# Patient Record
Sex: FEMALE
[De-identification: names, ages, dates, MRNs, and addresses within clinical notes are randomized per-mention and may not be internally consistent; named-entity substitution may affect disease eponyms.]

---

## 2020-03-13 ENCOUNTER — NURSE TRIAGE (OUTPATIENT)
Dept: OTHER | Facility: CLINIC | Age: 65
End: 2020-03-13

## 2020-03-13 NOTE — TELEPHONE ENCOUNTER
Reason for Disposition   No CORONAVIRUS EXPOSURE BUT [2] questions about    Protocols used: CORONAVIRUS (2019-NCOV) EXPOSURE-ADULT-[place]    Pt calling for MMO benefit. Caller is asking questions about her grandson's birthday party, if it should still be held. No complaints from pt. Just seeking information. Provided pt with information regarding Corona virus and exposure from protocol. Patient verbalized understanding. Patient denies any other questions or concerns. Please do not respond to the triage nurse through this encounter. Any subsequent communication should be directly with the patient.

## 2021-12-31 DIAGNOSIS — E83.52 SERUM CALCIUM ELEVATED: Primary | ICD-10-CM

## 2023-03-13 PROBLEM — M54.17 LUMBOSACRAL RADICULITIS: Status: ACTIVE | Noted: 2023-03-13

## 2023-03-13 PROBLEM — H90.41 SENSORINEURAL HEARING LOSS (SNHL) OF RIGHT EAR WITH UNRESTRICTED HEARING OF LEFT EAR: Status: ACTIVE | Noted: 2023-03-13

## 2023-03-13 PROBLEM — R92.8 ABNORMAL ULTRASOUND OF BREAST: Status: ACTIVE | Noted: 2023-03-13

## 2023-03-13 PROBLEM — M25.551 HIP PAIN, RIGHT: Status: ACTIVE | Noted: 2023-03-13

## 2023-03-13 PROBLEM — R92.8 ABNORMAL FINDING ON BREAST IMAGING: Status: ACTIVE | Noted: 2023-03-13

## 2023-03-13 PROBLEM — S56.912A STRAIN OF FOREARM, LEFT: Status: ACTIVE | Noted: 2023-03-13

## 2023-03-13 PROBLEM — M19.90 ARTHRITIS: Status: ACTIVE | Noted: 2023-03-13

## 2023-03-13 PROBLEM — R14.0 ABDOMINAL BLOATING: Status: ACTIVE | Noted: 2023-03-13

## 2023-03-13 PROBLEM — R21 LOCALIZED RASH: Status: ACTIVE | Noted: 2023-03-13

## 2023-03-13 PROBLEM — R92.8 ABNORMAL FINDING ON BREAST IMAGING: Status: RESOLVED | Noted: 2023-03-13 | Resolved: 2023-03-13

## 2023-03-13 PROBLEM — I10 HTN (HYPERTENSION): Status: ACTIVE | Noted: 2023-03-13

## 2023-03-13 PROBLEM — K59.00 CONSTIPATION: Status: ACTIVE | Noted: 2023-03-13

## 2023-03-13 PROBLEM — M54.9 BACK PAIN: Status: ACTIVE | Noted: 2023-03-13

## 2023-03-13 PROBLEM — K58.9 IRRITABLE BOWEL: Status: ACTIVE | Noted: 2023-03-13

## 2023-03-13 PROBLEM — R20.9 SENSORY DISTURBANCE: Status: ACTIVE | Noted: 2023-03-13

## 2023-03-13 PROBLEM — R92.8 ABNORMALITY OF LEFT BREAST ON SCREENING MAMMOGRAM: Status: ACTIVE | Noted: 2023-03-13

## 2023-03-13 PROBLEM — H91.90 HEARING LOSS: Status: ACTIVE | Noted: 2023-03-13

## 2023-03-13 PROBLEM — R20.8 DYSESTHESIA: Status: ACTIVE | Noted: 2023-03-13

## 2023-03-13 PROBLEM — K21.9 ACID REFLUX: Status: ACTIVE | Noted: 2023-03-13

## 2023-03-13 RX ORDER — PANTOPRAZOLE SODIUM 40 MG/1
40 TABLET, DELAYED RELEASE ORAL
COMMUNITY
Start: 2020-05-19 | End: 2023-12-06 | Stop reason: SDUPTHER

## 2023-03-13 RX ORDER — CAPSAICIN 0 G/G
CREAM TOPICAL 3 TIMES DAILY
COMMUNITY
End: 2023-07-03 | Stop reason: ALTCHOICE

## 2023-03-13 RX ORDER — TIZANIDINE 2 MG/1
1 TABLET ORAL 2 TIMES DAILY
COMMUNITY
Start: 2022-09-20 | End: 2023-07-03 | Stop reason: ALTCHOICE

## 2023-03-13 RX ORDER — LISINOPRIL AND HYDROCHLOROTHIAZIDE 20; 25 MG/1; MG/1
1 TABLET ORAL DAILY
COMMUNITY
Start: 2014-03-04 | End: 2023-04-14

## 2023-03-13 RX ORDER — MELOXICAM 15 MG/1
1 TABLET ORAL DAILY PRN
COMMUNITY
Start: 2022-09-20

## 2023-03-13 RX ORDER — AMLODIPINE BESYLATE 2.5 MG/1
1 TABLET ORAL DAILY
COMMUNITY
Start: 2019-02-01 | End: 2023-04-14

## 2023-03-14 ENCOUNTER — APPOINTMENT (OUTPATIENT)
Dept: PRIMARY CARE | Facility: CLINIC | Age: 68
End: 2023-03-14
Payer: MEDICARE

## 2023-03-15 ENCOUNTER — LAB (OUTPATIENT)
Dept: LAB | Facility: LAB | Age: 68
End: 2023-03-15
Payer: MEDICARE

## 2023-03-15 ENCOUNTER — OFFICE VISIT (OUTPATIENT)
Dept: PRIMARY CARE | Facility: CLINIC | Age: 68
End: 2023-03-15
Payer: MEDICARE

## 2023-03-15 VITALS
BODY MASS INDEX: 31.69 KG/M2 | HEIGHT: 64 IN | WEIGHT: 185.6 LBS | OXYGEN SATURATION: 98 % | TEMPERATURE: 98 F | RESPIRATION RATE: 16 BRPM | HEART RATE: 53 BPM | SYSTOLIC BLOOD PRESSURE: 126 MMHG | DIASTOLIC BLOOD PRESSURE: 67 MMHG

## 2023-03-15 DIAGNOSIS — Z01.818 PRE-OP EVALUATION: Primary | ICD-10-CM

## 2023-03-15 DIAGNOSIS — R01.1 HEART MURMUR: ICD-10-CM

## 2023-03-15 DIAGNOSIS — Z86.79 HX OF CARDIAC MURMUR: Primary | ICD-10-CM

## 2023-03-15 DIAGNOSIS — E83.52 SERUM CALCIUM ELEVATED: ICD-10-CM

## 2023-03-15 DIAGNOSIS — Z01.818 PRE-OP TESTING: ICD-10-CM

## 2023-03-15 LAB
ANION GAP IN SER/PLAS: 12 MMOL/L (ref 10–20)
CALCIDIOL (25 OH VITAMIN D3) (NG/ML) IN SER/PLAS: 36 NG/ML
CALCIUM (MG/DL) IN SER/PLAS: 10.6 MG/DL (ref 8.6–10.6)
CARBON DIOXIDE, TOTAL (MMOL/L) IN SER/PLAS: 30 MMOL/L (ref 21–32)
CHLORIDE (MMOL/L) IN SER/PLAS: 100 MMOL/L (ref 98–107)
CREATININE (MG/DL) IN SER/PLAS: 0.81 MG/DL (ref 0.5–1.05)
GFR FEMALE: 80 ML/MIN/1.73M2
GLUCOSE (MG/DL) IN SER/PLAS: 98 MG/DL (ref 74–99)
PARATHYRIN INTACT (PG/ML) IN SER/PLAS: 55.2 PG/ML (ref 18.5–88)
PHOSPHATE (MG/DL) IN SER/PLAS: 3.2 MG/DL (ref 2.5–4.9)
POC CALCIUM IONIZED (MMOL/L) IN BLOOD: 1.36 MMOL/L (ref 1.1–1.33)
POTASSIUM (MMOL/L) IN SER/PLAS: 3.4 MMOL/L (ref 3.5–5.3)
SODIUM (MMOL/L) IN SER/PLAS: 139 MMOL/L (ref 136–145)
UREA NITROGEN (MG/DL) IN SER/PLAS: 14 MG/DL (ref 6–23)

## 2023-03-15 PROCEDURE — 36415 COLL VENOUS BLD VENIPUNCTURE: CPT

## 2023-03-15 PROCEDURE — 84100 ASSAY OF PHOSPHORUS: CPT

## 2023-03-15 PROCEDURE — 99215 OFFICE O/P EST HI 40 MIN: CPT | Performed by: INTERNAL MEDICINE

## 2023-03-15 PROCEDURE — 82306 VITAMIN D 25 HYDROXY: CPT

## 2023-03-15 PROCEDURE — 3078F DIAST BP <80 MM HG: CPT | Performed by: INTERNAL MEDICINE

## 2023-03-15 PROCEDURE — 80048 BASIC METABOLIC PNL TOTAL CA: CPT

## 2023-03-15 PROCEDURE — 83970 ASSAY OF PARATHORMONE: CPT

## 2023-03-15 PROCEDURE — 3074F SYST BP LT 130 MM HG: CPT | Performed by: INTERNAL MEDICINE

## 2023-03-15 PROCEDURE — 82330 ASSAY OF CALCIUM: CPT

## 2023-03-15 PROCEDURE — 1160F RVW MEDS BY RX/DR IN RCRD: CPT | Performed by: INTERNAL MEDICINE

## 2023-03-15 PROCEDURE — 1159F MED LIST DOCD IN RCRD: CPT | Performed by: INTERNAL MEDICINE

## 2023-03-15 ASSESSMENT — ENCOUNTER SYMPTOMS
CHILLS: 0
LIGHT-HEADEDNESS: 0
CHEST TIGHTNESS: 0
UNEXPECTED WEIGHT CHANGE: 0
ENDOCRINE NEGATIVE: 1
APPETITE CHANGE: 0
ADENOPATHY: 0
PSYCHIATRIC NEGATIVE: 1
COUGH: 0
APNEA: 0
JOINT SWELLING: 0
DIARRHEA: 0
DIZZINESS: 0
ANAL BLEEDING: 0
WEAKNESS: 0
SORE THROAT: 0
BLOOD IN STOOL: 0
EYE REDNESS: 0
SHORTNESS OF BREATH: 0
PHOTOPHOBIA: 0
ARTHRALGIAS: 1
WHEEZING: 0
WOUND: 0
FEVER: 0
ABDOMINAL PAIN: 0
ACTIVITY CHANGE: 0
BRUISES/BLEEDS EASILY: 0
SINUS PRESSURE: 0
FATIGUE: 0
NAUSEA: 0
VOMITING: 0
PALPITATIONS: 0
SEIZURES: 0
NUMBNESS: 0
STRIDOR: 0
RHINORRHEA: 0
HEADACHES: 0
EYE PAIN: 0

## 2023-03-15 ASSESSMENT — PATIENT HEALTH QUESTIONNAIRE - PHQ9
1. LITTLE INTEREST OR PLEASURE IN DOING THINGS: NOT AT ALL
2. FEELING DOWN, DEPRESSED OR HOPELESS: NOT AT ALL
SUM OF ALL RESPONSES TO PHQ9 QUESTIONS 1 AND 2: 0

## 2023-03-15 ASSESSMENT — LIFESTYLE VARIABLES
HOW OFTEN DO YOU HAVE A DRINK CONTAINING ALCOHOL: NEVER
HOW OFTEN DO YOU HAVE SIX OR MORE DRINKS ON ONE OCCASION: NEVER

## 2023-03-15 NOTE — PROGRESS NOTES
Subjective   Patient ID: Britt Guillaume is a 67 y.o. female who presents for Procedure (Surgery clearance).  The patient is a 67-year-old female who is being seen today for preop evaluation.  She states that she is scheduled for left knee replacement surgery tomorrow 03/16/2023.  However she is considering rescheduling her surgery.  The patient was found to have an elevated serum calcium level of 11.0 and a history of a heart murmur for which preadmission testing requested preop clearance.  The patient had a previous appointment scheduled on 03/15/2023 for preop evaluation.  However she is states that she was unable to keep that appointment because of her previous commitment.        Review of Systems   Constitutional:  Negative for activity change, appetite change, chills, fatigue, fever and unexpected weight change.   HENT:  Negative for congestion, ear pain, hearing loss, rhinorrhea, sinus pressure and sore throat.    Eyes:  Negative for photophobia, pain, redness and visual disturbance.   Respiratory:  Negative for apnea, cough, chest tightness, shortness of breath, wheezing and stridor.    Cardiovascular:  Negative for chest pain, palpitations and leg swelling.   Gastrointestinal:  Negative for abdominal pain, anal bleeding, blood in stool, diarrhea, nausea and vomiting.   Endocrine: Negative.  Negative for cold intolerance.   Genitourinary: Negative.    Musculoskeletal:  Positive for arthralgias (knees left>right). Negative for joint swelling.   Skin:  Negative for rash and wound.   Neurological:  Negative for dizziness, seizures, syncope, weakness, light-headedness, numbness and headaches.   Hematological:  Negative for adenopathy. Does not bruise/bleed easily.   Psychiatric/Behavioral: Negative.         Objective   Physical Exam  Vitals reviewed.   Constitutional:       General: She is not in acute distress.     Appearance: Normal appearance. She is obese. She is not ill-appearing.   HENT:      Head:  Normocephalic and atraumatic.      Right Ear: Tympanic membrane, ear canal and external ear normal.      Left Ear: Tympanic membrane, ear canal and external ear normal.      Nose: Nose normal. No rhinorrhea.      Mouth/Throat:      Mouth: Mucous membranes are moist.      Pharynx: Oropharynx is clear. No oropharyngeal exudate or posterior oropharyngeal erythema.   Eyes:      General: No scleral icterus.        Right eye: No discharge.         Left eye: No discharge.      Extraocular Movements: Extraocular movements intact.      Conjunctiva/sclera: Conjunctivae normal.      Pupils: Pupils are equal, round, and reactive to light.   Cardiovascular:      Rate and Rhythm: Regular rhythm. Bradycardia present.      Heart sounds: Murmur (Grade 2/VI systolic murmur heard at the [place] and roght sternal border) heard.      No friction rub. No gallop.   Pulmonary:      Effort: Pulmonary effort is normal.      Breath sounds: Normal breath sounds.   Chest:      Chest wall: No tenderness.   Abdominal:      General: Bowel sounds are normal. There is no distension.      Palpations: Abdomen is soft. There is no mass.      Tenderness: There is no abdominal tenderness.   Musculoskeletal:         General: No swelling, tenderness or deformity.      Cervical back: Normal range of motion and neck supple. No rigidity or tenderness.      Right lower leg: No edema.      Left lower leg: No edema.   Skin:     General: Skin is warm and dry.      Findings: No bruising, erythema or rash.   Neurological:      General: No focal deficit present.      Mental Status: She is alert and oriented to person, place, and time.      Sensory: No sensory deficit.      Motor: No weakness.   Psychiatric:         Mood and Affect: Mood normal.         Behavior: Behavior normal.         Assessment/Plan   Problem List Items Addressed This Visit    None  Visit Diagnoses       Pre-op evaluation    -  Primary    Relevant Orders    Referral to Cardiology    Follow Up In  [facility] - PCP    Heart murmur        Relevant Orders    Referral to Cardiology    Follow Up In [facility] - PCP    Serum calcium elevated        Relevant Orders    Follow Up In [facility] - PCP

## 2023-03-15 NOTE — PATIENT INSTRUCTIONS
You were seen today for preoperative evaluation for pending left knee replacement surgery.  We addressed your history of heart murmur and also an elevated serum calcium which was seen on your recent preop blood tests completed on 03/01/2023.  Your examination today was significant for the presence of a systolic heart murmur and a heart rate of 53 bpm.  Your EKG completed during your preop testing on 03/01/2023 was also reviewed and it showed sinus bradycardia with a heart rate of 50 bpm but was otherwise unremarkable.  As we discussed, you should have your echocardiogram completed as was previously ordered and I will await the results of your lab work completed earlier today for evaluation of your elevated serum calcium.  I referral to  cardiology was completed for consultation with your heart murmur.  Continue to take your current medications as prescribed.  Follow-up with me after your cardiology evaluation.  It was a pleasure seeing you today.

## 2023-04-06 ENCOUNTER — TELEPHONE (OUTPATIENT)
Dept: PRIMARY CARE | Facility: CLINIC | Age: 68
End: 2023-04-06

## 2023-07-03 ENCOUNTER — OFFICE VISIT (OUTPATIENT)
Dept: PRIMARY CARE | Facility: CLINIC | Age: 68
End: 2023-07-03
Payer: MEDICARE

## 2023-07-03 VITALS
HEART RATE: 67 BPM | WEIGHT: 185.6 LBS | HEIGHT: 65 IN | SYSTOLIC BLOOD PRESSURE: 120 MMHG | DIASTOLIC BLOOD PRESSURE: 70 MMHG | OXYGEN SATURATION: 98 % | TEMPERATURE: 98 F | BODY MASS INDEX: 30.92 KG/M2

## 2023-07-03 DIAGNOSIS — E83.52 SERUM CALCIUM ELEVATED: ICD-10-CM

## 2023-07-03 DIAGNOSIS — I10 HYPERTENSION, UNSPECIFIED TYPE: Primary | ICD-10-CM

## 2023-07-03 DIAGNOSIS — Z86.79 HISTORY OF CARDIAC MURMUR: ICD-10-CM

## 2023-07-03 PROBLEM — E66.3 OVERWEIGHT: Status: ACTIVE | Noted: 2023-07-03

## 2023-07-03 PROBLEM — J06.9 ACUTE URI: Status: ACTIVE | Noted: 2023-07-03

## 2023-07-03 PROBLEM — M25.562 LEFT KNEE PAIN: Status: ACTIVE | Noted: 2023-07-03

## 2023-07-03 PROBLEM — R92.8 ABNORMAL FINDING ON BREAST IMAGING: Status: ACTIVE | Noted: 2023-07-03

## 2023-07-03 PROCEDURE — 1160F RVW MEDS BY RX/DR IN RCRD: CPT | Performed by: INTERNAL MEDICINE

## 2023-07-03 PROCEDURE — 3074F SYST BP LT 130 MM HG: CPT | Performed by: INTERNAL MEDICINE

## 2023-07-03 PROCEDURE — 1159F MED LIST DOCD IN RCRD: CPT | Performed by: INTERNAL MEDICINE

## 2023-07-03 PROCEDURE — 3078F DIAST BP <80 MM HG: CPT | Performed by: INTERNAL MEDICINE

## 2023-07-03 PROCEDURE — 99214 OFFICE O/P EST MOD 30 MIN: CPT | Performed by: INTERNAL MEDICINE

## 2023-07-03 RX ORDER — CELECOXIB 200 MG/1
200 CAPSULE ORAL
COMMUNITY
Start: 2023-03-29 | End: 2023-09-28 | Stop reason: ALTCHOICE

## 2023-07-03 RX ORDER — AMLODIPINE BESYLATE 2.5 MG/1
2.5 TABLET ORAL DAILY
Qty: 90 TABLET | Refills: 1 | Status: SHIPPED | OUTPATIENT
Start: 2023-07-03 | End: 2023-11-16 | Stop reason: SDUPTHER

## 2023-07-03 ASSESSMENT — ENCOUNTER SYMPTOMS
CHOKING: 0
WHEEZING: 0
HEADACHES: 0
TROUBLE SWALLOWING: 1
ABDOMINAL PAIN: 0
SORE THROAT: 0
COUGH: 0
RHINORRHEA: 0
VOMITING: 0
ARTHRALGIAS: 0
NUMBNESS: 0
DIARRHEA: 0
BLOOD IN STOOL: 0
DIZZINESS: 0
CONSTITUTIONAL NEGATIVE: 1
VOICE CHANGE: 0
PSYCHIATRIC NEGATIVE: 1
SHORTNESS OF BREATH: 0
NAUSEA: 0
JOINT SWELLING: 1

## 2023-07-03 NOTE — PATIENT INSTRUCTIONS
Diagnoses and all orders for this visit:  Hypertension, unspecified type (Primary)  Comments:  BP today is at goal.  Plan: Continue current management  Orders:  -     amLODIPine (Norvasc) 2.5 mg tablet; Take 1 tablet (2.5 mg) by mouth once daily.  Serum calcium elevated  Comments:  Most recent serum calcium level was in the normal range.  Orders:  -     Follow Up In [facility] - PCP  History of cardiac murmur  Comments:  Keep previously scheduled appt with Cardiology 07/20/2023.    F/U in 3 mths for HTN

## 2023-07-03 NOTE — PROGRESS NOTES
Subjective   Patient ID: Britt Guillaume is a 68 y.o. female who presents for No chief complaint on file..  The patient is a 69 YO female who is being seen today for HTN, elevated serum calcium level and a hx of a heart murmur.  She denies SOB or chest pain.  She has a pending appointment with cardiology. Her most recent serum calcium level was in the normal range.         Review of Systems   Constitutional: Negative.    HENT:  Positive for trouble swallowing (intermittent trouble swallowing for appx 2 mths). Negative for ear pain, rhinorrhea, sore throat and voice change.    Respiratory:  Negative for cough, choking, shortness of breath and wheezing.    Cardiovascular:  Negative for chest pain and leg swelling.   Gastrointestinal:  Negative for abdominal pain, blood in stool, diarrhea, nausea and vomiting.   Musculoskeletal:  Positive for joint swelling (intermittent swelling of the left knee S/P surgery appx 3 mths ago.). Negative for arthralgias.   Skin:  Negative for rash.   Neurological:  Negative for dizziness, numbness and headaches.   Psychiatric/Behavioral: Negative.         Objective   Physical Exam  Vitals reviewed.   Constitutional:       Appearance: Normal appearance.   Cardiovascular:      Rate and Rhythm: Regular rhythm.      Heart sounds: Murmur (grade 2/VI systolic murmur that is lodest at the left upper sternal border.) heard.   Pulmonary:      Effort: Pulmonary effort is normal.      Breath sounds: Normal breath sounds.   Abdominal:      General: Bowel sounds are normal.      Palpations: Abdomen is soft.   Musculoskeletal:      Cervical back: Normal range of motion and neck supple.      Right lower leg: No edema.      Left lower leg: No edema.   Skin:     General: Skin is warm and dry.   Neurological:      Mental Status: She is alert and oriented to person, place, and time.   Psychiatric:         Mood and Affect: Mood normal.         Behavior: Behavior normal.         Assessment/Plan   Diagnoses  and all orders for this visit:  Hypertension, unspecified type (Primary)  Comments:  BP today is at goal.  Plan: Continue current management  Orders:  -     amLODIPine (Norvasc) 2.5 mg tablet; Take 1 tablet (2.5 mg) by mouth once daily.  Serum calcium elevated  Comments:  Most recent serum calcium level was in the normal range.  Orders:  -     Follow Up In [facility] - PCP  History of cardiac murmur  Comments:  Keep previously scheduled appt with Cardiology 07/20/2023.     F/U in 3 mths for HTN

## 2023-09-28 ENCOUNTER — OFFICE VISIT (OUTPATIENT)
Dept: PRIMARY CARE | Facility: CLINIC | Age: 68
End: 2023-09-28
Payer: MEDICARE

## 2023-09-28 VITALS
OXYGEN SATURATION: 96 % | TEMPERATURE: 97.7 F | WEIGHT: 184.7 LBS | RESPIRATION RATE: 16 BRPM | BODY MASS INDEX: 30.77 KG/M2 | DIASTOLIC BLOOD PRESSURE: 70 MMHG | SYSTOLIC BLOOD PRESSURE: 138 MMHG | HEIGHT: 65 IN | HEART RATE: 65 BPM

## 2023-09-28 DIAGNOSIS — I10 HYPERTENSION, UNSPECIFIED TYPE: Primary | ICD-10-CM

## 2023-09-28 DIAGNOSIS — Z23 FLU VACCINE NEED: ICD-10-CM

## 2023-09-28 DIAGNOSIS — E66.9 CLASS 1 OBESITY WITH BODY MASS INDEX (BMI) OF 30.0 TO 30.9 IN ADULT, UNSPECIFIED OBESITY TYPE, UNSPECIFIED WHETHER SERIOUS COMORBIDITY PRESENT: ICD-10-CM

## 2023-09-28 PROBLEM — R09.89 BRUIT OF LEFT CAROTID ARTERY: Status: ACTIVE | Noted: 2023-09-28

## 2023-09-28 PROBLEM — R01.1 HEART MURMUR: Status: ACTIVE | Noted: 2023-09-28

## 2023-09-28 PROCEDURE — 1036F TOBACCO NON-USER: CPT | Performed by: INTERNAL MEDICINE

## 2023-09-28 PROCEDURE — 1159F MED LIST DOCD IN RCRD: CPT | Performed by: INTERNAL MEDICINE

## 2023-09-28 PROCEDURE — 3078F DIAST BP <80 MM HG: CPT | Performed by: INTERNAL MEDICINE

## 2023-09-28 PROCEDURE — 99213 OFFICE O/P EST LOW 20 MIN: CPT | Performed by: INTERNAL MEDICINE

## 2023-09-28 PROCEDURE — 3075F SYST BP GE 130 - 139MM HG: CPT | Performed by: INTERNAL MEDICINE

## 2023-09-28 PROCEDURE — 1160F RVW MEDS BY RX/DR IN RCRD: CPT | Performed by: INTERNAL MEDICINE

## 2023-09-28 PROCEDURE — 1126F AMNT PAIN NOTED NONE PRSNT: CPT | Performed by: INTERNAL MEDICINE

## 2023-09-28 PROCEDURE — 3008F BODY MASS INDEX DOCD: CPT | Performed by: INTERNAL MEDICINE

## 2023-09-28 RX ORDER — LOSARTAN POTASSIUM 50 MG/1
1 TABLET ORAL DAILY
COMMUNITY
Start: 2023-08-14 | End: 2023-11-29

## 2023-09-28 RX ORDER — PANTOPRAZOLE SODIUM 40 MG/1
40 TABLET, DELAYED RELEASE ORAL
Qty: 90 TABLET | Refills: 0 | Status: CANCELLED | OUTPATIENT
Start: 2023-09-28 | End: 2023-12-27

## 2023-09-28 RX ORDER — TIZANIDINE 2 MG/1
TABLET ORAL
COMMUNITY
Start: 2023-07-05 | End: 2024-02-15 | Stop reason: SINTOL

## 2023-09-28 RX ORDER — HYDROCHLOROTHIAZIDE 25 MG/1
1 TABLET ORAL DAILY
COMMUNITY
Start: 2023-08-14

## 2023-09-28 ASSESSMENT — PATIENT HEALTH QUESTIONNAIRE - PHQ9
SUM OF ALL RESPONSES TO PHQ9 QUESTIONS 1 & 2: 0
2. FEELING DOWN, DEPRESSED OR HOPELESS: NOT AT ALL
1. LITTLE INTEREST OR PLEASURE IN DOING THINGS: NOT AT ALL

## 2023-09-28 ASSESSMENT — LIFESTYLE VARIABLES
SKIP TO QUESTIONS 9-10: 1
AUDIT-C TOTAL SCORE: 0
HOW MANY STANDARD DRINKS CONTAINING ALCOHOL DO YOU HAVE ON A TYPICAL DAY: PATIENT DOES NOT DRINK
HOW OFTEN DO YOU HAVE A DRINK CONTAINING ALCOHOL: NEVER
HOW OFTEN DO YOU HAVE SIX OR MORE DRINKS ON ONE OCCASION: NEVER

## 2023-09-28 ASSESSMENT — ENCOUNTER SYMPTOMS: CONSTITUTIONAL NEGATIVE: 1

## 2023-09-28 NOTE — PROGRESS NOTES
Subjective   Patient ID: Britt Guillaume is a 68 y.o. female who presents for Hypertension.  68-year-old female who is being seen today for hypertension.      Review of Systems   Constitutional: Negative.    HENT:  Negative for congestion.    Respiratory:  Negative for cough and shortness of breath.    Cardiovascular:  Negative for chest pain and leg swelling.   Neurological:  Negative for dizziness, numbness and headaches.       Objective   Physical Exam  Vitals reviewed.   Constitutional:       Appearance: Normal appearance.   Cardiovascular:      Rate and Rhythm: Normal rate and regular rhythm.      Heart sounds: Normal heart sounds.   Pulmonary:      Effort: Pulmonary effort is normal.      Breath sounds: Normal breath sounds.   Abdominal:      General: Bowel sounds are normal.      Palpations: Abdomen is soft.      Tenderness: There is no abdominal tenderness.   Neurological:      Mental Status: She is alert and oriented to person, place, and time.   Psychiatric:         Mood and Affect: Mood normal.         Behavior: Behavior normal.         Assessment/Plan     Britt was seen today for hypertension.  Diagnoses and all orders for this visit:  Hypertension, unspecified type (Primary)  Comments:  BP today is 138/70. Goal is BP<130/80.  Plan: Continue current medication regimen  -DASH diet  -weight reduction  Orders:  -     Basic Metabolic Panel; Future  Flu vaccine need  Class 1 obesity with body mass index (BMI) of 30.0 to 30.9 in adult, unspecified obesity type, unspecified whether serious comorbidity present  Comments:  Resume weight reduction.  -lifestyle modification( diet and exercise).     F/U with the nurse for BP check in 3-4 weeks  F/U with PCP in 2-3 mths for HTN

## 2023-09-28 NOTE — PATIENT INSTRUCTIONS
Assessment/Plan     Britt was seen today for hypertension.  Diagnoses and all orders for this visit:  Hypertension, unspecified type (Primary)  Comments:  BP today is 138/70. Goal is BP<130/80.  Plan: Continue current medication regimen  -DASH diet  -weight reduction  Orders:  -     Basic Metabolic Panel; Future  Flu vaccine need  Class 1 obesity with body mass index (BMI) of 30.0 to 30.9 in adult, unspecified obesity type, unspecified whether serious comorbidity present  Comments:  Resume weight reduction.  -lifestyle modification( diet and exercise).     F/U with the nurse for BP check in 3-4 weeks  F/U with PCP in 2-3 mths for HTN

## 2023-10-10 ASSESSMENT — ENCOUNTER SYMPTOMS
DIZZINESS: 0
NUMBNESS: 0
SHORTNESS OF BREATH: 0
COUGH: 0
HEADACHES: 0

## 2023-10-16 ENCOUNTER — APPOINTMENT (OUTPATIENT)
Dept: PHYSICAL THERAPY | Facility: HOSPITAL | Age: 68
End: 2023-10-16
Payer: MEDICARE

## 2023-10-20 ENCOUNTER — CLINICAL SUPPORT (OUTPATIENT)
Dept: PRIMARY CARE | Facility: CLINIC | Age: 68
End: 2023-10-20
Payer: MEDICARE

## 2023-10-20 VITALS
WEIGHT: 184 LBS | OXYGEN SATURATION: 99 % | HEART RATE: 88 BPM | TEMPERATURE: 98 F | RESPIRATION RATE: 14 BRPM | DIASTOLIC BLOOD PRESSURE: 60 MMHG | SYSTOLIC BLOOD PRESSURE: 128 MMHG | BODY MASS INDEX: 30.66 KG/M2 | HEIGHT: 65 IN

## 2023-10-20 DIAGNOSIS — I10 HYPERTENSION, UNSPECIFIED TYPE: ICD-10-CM

## 2023-10-20 NOTE — PROGRESS NOTES
Patient is here for blood pressure check, her reading today is 128/60. Dr. George wants to continue with current plan and follow-up with PCP. Patient understood and is scheduled for follow-up .

## 2023-11-01 ENCOUNTER — TREATMENT (OUTPATIENT)
Dept: PHYSICAL THERAPY | Facility: HOSPITAL | Age: 68
End: 2023-11-01
Payer: MEDICARE

## 2023-11-01 DIAGNOSIS — M25.562 LEFT KNEE PAIN, UNSPECIFIED CHRONICITY: Primary | ICD-10-CM

## 2023-11-01 PROCEDURE — 97110 THERAPEUTIC EXERCISES: CPT | Mod: GP | Performed by: PHYSICAL THERAPIST

## 2023-11-01 NOTE — PROGRESS NOTES
Physical Therapy    Physical Therapy Treatment and Discharge Note    Patient Name: Britt Guillaume  MRN: [id number]  Today's Date: 11/1/2023         Assessment:   Pt raudel tx well with no c/o pain during or after session. She demo's improved LEFS score today and improved L knee ROM. Pt has met all goals and is ready for discharge.    Plan:     Discharge from PT to HEP as pt has met all goals and is back to PLOF.  Current Problem  No diagnosis found.    Subjective   Reports she felt like she regressed some because she threw out her back 2-3 weeks ago and was resting a lot. She does some exercise most days of the week. She is back to water aerobics and doing other activities she used to do.   General     Precautions   none  Vital Signs     Pain   2/10 throb in L knee when she saw Tre    Objective   Ortho   re-eval:     ROM:  L knee flex: 112 deg, ext 0deg      Strength:  knee flex and ext 5/5 with no pain  L hip abd 4+/5     LEFS 75/80.     Treatments:  Therapeutic exercise (97954): 45 timed minutes , 3 units .   Upright bike L5 x 8 min during subjective  B Rows with elbows extended 15 lbs in SLS x 20 R/L  Resisted fwd walking over four 6-inch hurtles x 10  Resisted side stepping over four 6-inch hurtles x 5 R/L    OP EDUCATION:   Reviewed [facility]    Goals:

## 2023-11-16 ENCOUNTER — OFFICE VISIT (OUTPATIENT)
Dept: CARDIOLOGY | Facility: HOSPITAL | Age: 68
End: 2023-11-16
Payer: MEDICARE

## 2023-11-16 VITALS
BODY MASS INDEX: 30.92 KG/M2 | DIASTOLIC BLOOD PRESSURE: 70 MMHG | OXYGEN SATURATION: 94 % | SYSTOLIC BLOOD PRESSURE: 151 MMHG | WEIGHT: 185.6 LBS | HEIGHT: 65 IN

## 2023-11-16 DIAGNOSIS — R73.03 PREDIABETES: ICD-10-CM

## 2023-11-16 DIAGNOSIS — I10 HYPERTENSION, UNSPECIFIED TYPE: Primary | ICD-10-CM

## 2023-11-16 DIAGNOSIS — Z82.49 FAMILY HISTORY OF ATHEROSCLEROSIS: ICD-10-CM

## 2023-11-16 DIAGNOSIS — Z87.898 HISTORY OF PREDIABETES: ICD-10-CM

## 2023-11-16 DIAGNOSIS — R09.89 BRUIT OF LEFT CAROTID ARTERY: ICD-10-CM

## 2023-11-16 PROCEDURE — 3008F BODY MASS INDEX DOCD: CPT | Performed by: INTERNAL MEDICINE

## 2023-11-16 PROCEDURE — 1036F TOBACCO NON-USER: CPT | Performed by: INTERNAL MEDICINE

## 2023-11-16 PROCEDURE — 1126F AMNT PAIN NOTED NONE PRSNT: CPT | Performed by: INTERNAL MEDICINE

## 2023-11-16 PROCEDURE — 3078F DIAST BP <80 MM HG: CPT | Performed by: INTERNAL MEDICINE

## 2023-11-16 PROCEDURE — 1159F MED LIST DOCD IN RCRD: CPT | Performed by: INTERNAL MEDICINE

## 2023-11-16 PROCEDURE — 99214 OFFICE O/P EST MOD 30 MIN: CPT | Performed by: INTERNAL MEDICINE

## 2023-11-16 PROCEDURE — 1160F RVW MEDS BY RX/DR IN RCRD: CPT | Performed by: INTERNAL MEDICINE

## 2023-11-16 PROCEDURE — 3077F SYST BP >= 140 MM HG: CPT | Performed by: INTERNAL MEDICINE

## 2023-11-16 RX ORDER — AMLODIPINE BESYLATE 5 MG/1
2.5 TABLET ORAL DAILY
Qty: 45 TABLET | Refills: 3 | Status: SHIPPED | OUTPATIENT
Start: 2023-11-16 | End: 2024-03-07 | Stop reason: SDUPTHER

## 2023-11-16 ASSESSMENT — ENCOUNTER SYMPTOMS
OCCASIONAL FEELINGS OF UNSTEADINESS: 0
DEPRESSION: 0
LOSS OF SENSATION IN FEET: 0

## 2023-11-16 NOTE — PROGRESS NOTES
Primary Care Physician: Magnolia Goerge MD      Date of Visit: 11/16/2023 10:20 AM EST  Location of visit: [facility]   Type of Visit: Established Patient     HPI / Summary:   Britt Guillaume is a very pleasant 68 y.o. female with HTN prediabetes and heart murmur who returns for follow up    Pt is a 68 year old woman with HTN, IBS, prediabetes h/o acid reflux referred for heart murmur.     CAD risk factors: + HTN, + prediabetes, no hyperlipidemia, + FHx CAD ( brother with MI in early 60s) nonsmoker  Pt had knee surgery in March and is going to physical therapy and walks daily for 30 minutes without Cp or SOB.   As part of her preop assessment given presence of heart murmur she had cardiac echo ( pt states she has had an murmur for years)   Echo March 15 2023: normal LV size and systolic function with LVEF 65-70%. Mild LVH. report and images reviewed- AV not well seen, no MR no TR . Overall no valvular abnormalities.   Pt thought she was having allergy issues when she was on lisinopril, however since changing to losartan her cough has resolved. Her weight has been stable and her BP at home has been running 120s/68-72, however at her last visit to her primary MD her BP was elevated and her monitor was reading appox 20mmHg lower than the office monitor, so she has stopped checking recently. She has been doing water aerobics and recently finished physical therapy for her knee. With this level of activity she has no CP or SOB at rest or on exertion. Has no palpitations presyncope or syncope or dizziness.   For CAD risk assessment had CT for CAC 9/29/2023: CAC=0.        ROS: Full 10 pt review of symptoms of negative unless discussed above.     Problems:   Patient Active Problem List   Diagnosis    Abdominal bloating    Constipation    Abnormal ultrasound of breast    Abnormality of left breast on screening mammogram    Acid reflux    Arthritis    Back pain    Dysesthesia    Hearing loss    Hip pain, right    HTN  "(hypertension)    Irritable bowel    Localized rash    Lumbosacral radiculitis    Sensory disturbance    Strain of forearm, left    Sensorineural hearing loss (SNHL) of right ear with unrestricted hearing of left ear    Acute URI    Left knee pain    Overweight    Abnormal finding on breast imaging    Hypertension    Bruit of left carotid artery    Heart murmur     Medical History:   History reviewed. No pertinent past medical history.  Surgical Hx:   Past Surgical History:   Procedure Laterality Date     SECTION, CLASSIC  2018     Section    KNEE SURGERY  2018    Knee Surgery Left    OTHER SURGICAL HISTORY  2019     section    OTHER SURGICAL HISTORY  2019    Knee surgery    OTHER SURGICAL HISTORY  2019    Hand surgery    OTHER SURGICAL HISTORY  2019    Hysterectomy supracervical    TOTAL ABDOMINAL HYSTERECTOMY  06/10/2015    Total Abdominal Hysterectomy      Social Hx:   Tobacco Use: Low Risk  (2023)    Patient History     Smoking Tobacco Use: Never     Smokeless Tobacco Use: Never     Passive Exposure: Not on file     Alcohol Use: Not At Risk (2023)    AUDIT-C     Frequency of Alcohol Consumption: Never     Average Number of Drinks: Patient does not drink     Frequency of Binge Drinking: Never     Family Hx:   Family History   Problem Relation Name Age of Onset    Hypertension Mother      Hypertension Father      Hypertension Sister      Hypertension Brother      Other cancer Brother          Malignant neoplasm of intestine    Hypertension Sibling        Exam:   Vitals:   Vitals:    23 1031   BP: 151/70   BP Location: Left arm   Patient Position: Sitting   BP Cuff Size: Adult   SpO2: 94%   Weight: 84.2 kg (185 lb 9.6 oz)   Height: 1.651 m (5' 5\")     Wt Readings from Last 5 Encounters:   23 84.2 kg (185 lb 9.6 oz)   10/20/23 83.5 kg (184 lb)   23 83.8 kg (184 lb 11.2 oz)   23 84.1 kg (185 lb 8 oz)   23 84.2 kg (185 " "lb 9.6 oz)      Constitutional:       Appearance: Healthy appearance. Not in distress.   Pulmonary:      Effort: Pulmonary effort is normal.      Breath sounds: Normal breath sounds.   Cardiovascular:      Normal rate. Regular rhythm. S1 with normal intensity. S2 with normal intensity.       Murmurs: There is a systolic murmur at the ULSB.   Edema:     Peripheral edema absent.   Abdominal:      General: Bowel sounds are normal.      Palpations: Abdomen is soft.   Neurological:      Mental Status: Alert and oriented to person, place and time.       Labs:   Recent Labs     21  1419   WBC 9.7   HGB 13.3   HCT 40.7      MCV 89     Recent Labs     03/15/23  0946 10/17/22  1006 21  1159 21  1419 06/10/20  1523 19  1009    140 141 140 141 139   K 3.4* 3.8 3.5 3.5 3.5 3.4*    101 101 101 101 101   BUN 14 13 12 15 14 11   CREATININE 0.81 0.81 0.83 0.89 0.92 0.73      No results for input(s): \"HGBA1C\", \"BNP\", \"FERRITIN\", \"TIBC\", \"IRONSAT\" in the last 61999 hours.  RESUFAST(CHOL:1,HDL:1,LDLF:1,TRI)  Lab Results   Component Value Date    CHOL 189 10/17/2022    HDL 47.9 10/17/2022    LDLF 124 (H) 10/17/2022    TRIG 86 10/17/2022     Notable Studies: imaging personally reviewed and summarized by me below  EK/15/2023: Sinus bradycardia at 56 bpm, nonspecific ST changes, unchanged from 2010    Echo:  - Echo (3/15/2023)   PHYSICIAN INTERPRETATION:  Left Ventricle: The left ventricular systolic function is normal, with an estimated ejection fraction of 65-70%. There are no regional wall motion abnormalities. The left ventricular cavity size is normal. There is mild left ventricular hypertrophy. Spectral Doppler shows a normal pattern of left ventricular diastolic filling.  Left Atrium: The left atrium is normal in size.  Right Ventricle: The right ventricle is normal in size. There is normal right ventricular global systolic function.  Right Atrium: The right atrium is normal " in size.  Aortic Valve: The aortic valve appears structurally normal. There is no evidence of aortic valve regurgitation. The peak instantaneous gradient of the aortic valve is 12.2 mmHg. The mean gradient of the aortic valve is 5.8 mmHg.  Mitral Valve: The mitral valve is normal in structure. There is no evidence of mitral valve regurgitation.  Tricuspid Valve: The tricuspid valve is structurally normal. No evidence of tricuspid regurgitation.  Pulmonic Valve: The pulmonic valve is structurally normal. There is no indication of pulmonic valve regurgitation.  Pericardium: There is no pericardial effusion noted.  Aorta: The aortic root is normal.  In comparison to the previous echocardiogram(s): There are no prior studies on this patient for comparison purposes.     CONCLUSIONS:  1. Left ventricular systolic function is normal with a 65-70% estimated ejection fraction.  2. No valvular abnormalities.     Carotid artery Dopplers 8/14/2023  CONCLUSIONS:  Right Carotid: Findings are consistent with less than 50% stenosis of the right proximal internal carotid artery. Laminar flow seen by color Doppler. Right external carotid artery appears patent with no evidence of stenosis. No evidence of hemodynamically significant stenosis of the right common carotid artery. The right vertebral artery is patent with antegrade flow. No evidence of hemodynamically significant stenosis in the right subclavian artery.  Left Carotid: Findings are consistent with less than 50% stenosis of the left proximal internal carotid artery. Laminar flow seen by color Doppler. Left external carotid artery appears patent with no evidence of stenosis. No evidence of hemodynamically significant stenosis of the left common carotid artery. The left vertebral artery is patent with antegrade flow. No evidence of hemodynamically significant stenosis in the left subclavian artery.     Imaging & Doppler Findings:  Right Plaque Morph: The proximal right  subclavian artery demonstrates heterogenous plaque. The right carotid bulb demonstrates heterogenous plaque.  Left Plaque Morph: The proximal left internal carotid artery demonstrates heterogenous plaque. The left carotid bulb demonstrates heterogenous plaque.      Current Outpatient Medications   Medication Instructions    amLODIPine (NORVASC) 2.5 mg, oral, Daily    hydroCHLOROthiazide (HYDRODiuril) 25 mg tablet 1 tablet, oral, Daily    losartan (Cozaar) 50 mg tablet 1 tablet, oral, Daily    meloxicam (Mobic) 15 mg tablet 1 tablet, oral, Daily PRN    pantoprazole (PROTONIX) 40 mg, oral, Daily before breakfast, (30 min to 1 hour before breakfast).    tiZANidine (Zanaflex) 2 mg tablet      Impressions and Plan:    Pt is a beata 68 year old woman with HTN prediabetes, FHx of CAD and heart murmur who comes for follow up Her recent cardiac echo shows no valvular pathology and she is active without any cardiac sx. Given no valvular disease likely functional flow murmur. No rx needed. For her BP control she is on losartan 50 mg daily amlodipine 2.5 mg and and HCTZ 25 mg daily . Her BP in the office today as well as at her primary MD has been elevated and she is waiting to get a new home monitor since her old on e was found to be inaccurate. Will increase her amlodipine today from 2.5 mg daily to 5 mg daily . Pt with left carotid bruit- vascular studies to assess showed no significant carotid stenoses.   Plan  1. cardiac murmur- no valvular pathology on echo  2. HTN- Increase amlodipine to 5 mg daily,continue  HCTZ 25 mg daily and losartan 50 mg daily. Home BP and HR checks for review. TO call office in 2 weeks to review BP  3. CAD risk - CT scan for CAC shoed CAC-0. No indication for statin at this time from a cardiac perspective.   4. carotid bruit (L)- no significant stenoses on vascular studies. Reviewed these studies with Dr STANTON Diaz( head of vascularllab) who felt that although no significant stenoses, there are  significant enough heterogeneous plaque that she would benefit from statin rx from a vascular perspective. Will start rosuvastatin 10 mg daily   5. H/o prediabetes- will recheck HgA1C.  return to clinic in 3 months.    Blood work due for today including HgA1C lipids and BMP    Patient Instructions:  If you have any questions or need cardiac medication refills, please call my office at [phone],      To reach my office please call [phone]  To schedule an appointment call [phone].          ____________________________________________________________  Fiordaliza Tena MD  Division of Cardiovascular Medicine  [facility]  [facility]

## 2023-11-16 NOTE — PATIENT INSTRUCTIONS
1. High blood pressure- BP high today, and you do not currently have a properly functioning BP   monitor- please try to obtain the monitor and resume checking BP and heart rate at home. Will increase amlodipine to 5 mg daily and continue HCTZ 25 mg daily and losartan 50 mg daily. Please check daily blood pressure and heart rate and log for review. Please call my office in 2 weeks to review BP readings to allow medications to be adjusted appropriately. Can take 2 of your 2.5 mg tablets of amlodipine until your current supply is done.   2. Heart murmur- echo with no significant valvular pathology  3. Left carotid bruit- no significant stenoses  4. Family history of CAD ( brother with MI in early 60s. ) -CT scan for CAC showed calcium score of 0 so no indication for statin at this time.  5. H/o prediabetes- to recheck HgA1C  Blood work to be done- HA1C, fasting lipid panel and BMP  Return to clinic in 3 months. Pt to call office in 2 weeks with BP readings.

## 2023-11-17 RX ORDER — ROSUVASTATIN CALCIUM 10 MG/1
10 TABLET, COATED ORAL DAILY
Qty: 30 TABLET | Refills: 11 | Status: SHIPPED | OUTPATIENT
Start: 2023-11-17 | End: 2024-11-16

## 2023-11-20 ENCOUNTER — LAB (OUTPATIENT)
Dept: LAB | Facility: LAB | Age: 68
End: 2023-11-20
Payer: MEDICARE

## 2023-11-20 DIAGNOSIS — R73.03 PREDIABETES: ICD-10-CM

## 2023-11-20 DIAGNOSIS — I10 HYPERTENSION, UNSPECIFIED TYPE: ICD-10-CM

## 2023-11-20 DIAGNOSIS — Z87.898 HISTORY OF PREDIABETES: ICD-10-CM

## 2023-11-20 LAB
ANION GAP SERPL CALC-SCNC: 15 MMOL/L (ref 10–20)
BUN SERPL-MCNC: 16 MG/DL (ref 6–23)
CALCIUM SERPL-MCNC: 10.2 MG/DL (ref 8.6–10.6)
CHLORIDE SERPL-SCNC: 102 MMOL/L (ref 98–107)
CHOLEST SERPL-MCNC: 174 MG/DL (ref 0–199)
CHOLESTEROL/HDL RATIO: 3.5
CO2 SERPL-SCNC: 28 MMOL/L (ref 21–32)
CREAT SERPL-MCNC: 0.85 MG/DL (ref 0.5–1.05)
EST. AVERAGE GLUCOSE BLD GHB EST-MCNC: 105 MG/DL
GFR SERPL CREATININE-BSD FRML MDRD: 76 ML/MIN/1.73M*2
GLUCOSE SERPL-MCNC: 90 MG/DL (ref 74–99)
HBA1C MFR BLD: 5.3 %
HDLC SERPL-MCNC: 50 MG/DL
LDLC SERPL CALC-MCNC: 110 MG/DL
NON HDL CHOLESTEROL: 124 MG/DL (ref 0–149)
POTASSIUM SERPL-SCNC: 3.5 MMOL/L (ref 3.5–5.3)
SODIUM SERPL-SCNC: 141 MMOL/L (ref 136–145)
TRIGL SERPL-MCNC: 69 MG/DL (ref 0–149)
VLDL: 14 MG/DL (ref 0–40)

## 2023-11-20 PROCEDURE — 80048 BASIC METABOLIC PNL TOTAL CA: CPT

## 2023-11-20 PROCEDURE — 80061 LIPID PANEL: CPT

## 2023-11-20 PROCEDURE — 83036 HEMOGLOBIN GLYCOSYLATED A1C: CPT

## 2023-11-20 PROCEDURE — 36415 COLL VENOUS BLD VENIPUNCTURE: CPT

## 2023-11-24 NOTE — RESULT ENCOUNTER NOTE
Pt's LDL ( bad cholesterol) is somewhat improved from 1 year ago( decreased from 124 to 110). IN addition given her CAC=0, there is no indication at this time for statin therapy.

## 2023-11-28 DIAGNOSIS — I10 HYPERTENSION, UNSPECIFIED TYPE: Primary | ICD-10-CM

## 2023-11-29 RX ORDER — LOSARTAN POTASSIUM 50 MG/1
50 TABLET ORAL DAILY
Qty: 90 TABLET | Refills: 3 | Status: SHIPPED | OUTPATIENT
Start: 2023-11-29 | End: 2024-03-07 | Stop reason: SDUPTHER

## 2023-12-04 ENCOUNTER — APPOINTMENT (OUTPATIENT)
Dept: PRIMARY CARE | Facility: CLINIC | Age: 68
End: 2023-12-04
Payer: MEDICARE

## 2023-12-06 ENCOUNTER — TELEPHONE (OUTPATIENT)
Dept: GASTROENTEROLOGY | Facility: CLINIC | Age: 68
End: 2023-12-06
Payer: MEDICARE

## 2023-12-06 DIAGNOSIS — K21.9 GASTROESOPHAGEAL REFLUX DISEASE, UNSPECIFIED WHETHER ESOPHAGITIS PRESENT: Primary | ICD-10-CM

## 2023-12-06 RX ORDER — PANTOPRAZOLE SODIUM 40 MG/1
40 TABLET, DELAYED RELEASE ORAL
Qty: 90 TABLET | Refills: 3 | Status: SHIPPED | OUTPATIENT
Start: 2023-12-06

## 2023-12-28 ENCOUNTER — APPOINTMENT (OUTPATIENT)
Dept: PRIMARY CARE | Facility: CLINIC | Age: 68
End: 2023-12-28
Payer: MEDICARE

## 2024-01-19 ENCOUNTER — OFFICE VISIT (OUTPATIENT)
Dept: PRIMARY CARE | Facility: CLINIC | Age: 69
End: 2024-01-19
Payer: MEDICARE

## 2024-01-19 VITALS
HEART RATE: 60 BPM | WEIGHT: 186.2 LBS | DIASTOLIC BLOOD PRESSURE: 69 MMHG | BODY MASS INDEX: 30.99 KG/M2 | SYSTOLIC BLOOD PRESSURE: 147 MMHG

## 2024-01-19 DIAGNOSIS — R01.1 HEART MURMUR: ICD-10-CM

## 2024-01-19 DIAGNOSIS — I70.90 ATHEROSCLEROTIC PLAQUE: ICD-10-CM

## 2024-01-19 DIAGNOSIS — I10 PRIMARY HYPERTENSION: Primary | ICD-10-CM

## 2024-01-19 PROBLEM — M19.90 ARTHRITIS: Status: RESOLVED | Noted: 2023-03-13 | Resolved: 2024-01-19

## 2024-01-19 PROBLEM — J06.9 ACUTE URI: Status: RESOLVED | Noted: 2023-07-03 | Resolved: 2024-01-19

## 2024-01-19 PROBLEM — H91.90 HEARING LOSS: Status: RESOLVED | Noted: 2023-03-13 | Resolved: 2024-01-19

## 2024-01-19 PROBLEM — R92.8 ABNORMAL ULTRASOUND OF BREAST: Status: RESOLVED | Noted: 2023-03-13 | Resolved: 2024-01-19

## 2024-01-19 PROBLEM — R21 LOCALIZED RASH: Status: RESOLVED | Noted: 2023-03-13 | Resolved: 2024-01-19

## 2024-01-19 PROBLEM — R20.9 SENSORY DISTURBANCE: Status: RESOLVED | Noted: 2023-03-13 | Resolved: 2024-01-19

## 2024-01-19 PROBLEM — R92.8 ABNORMAL FINDING ON BREAST IMAGING: Status: RESOLVED | Noted: 2023-07-03 | Resolved: 2024-01-19

## 2024-01-19 PROBLEM — M54.9 BACK PAIN: Status: RESOLVED | Noted: 2023-03-13 | Resolved: 2024-01-19

## 2024-01-19 PROBLEM — M25.551 HIP PAIN, RIGHT: Status: RESOLVED | Noted: 2023-03-13 | Resolved: 2024-01-19

## 2024-01-19 PROBLEM — R14.0 ABDOMINAL BLOATING: Status: RESOLVED | Noted: 2023-03-13 | Resolved: 2024-01-19

## 2024-01-19 PROBLEM — S56.912A STRAIN OF FOREARM, LEFT: Status: RESOLVED | Noted: 2023-03-13 | Resolved: 2024-01-19

## 2024-01-19 PROBLEM — M25.562 LEFT KNEE PAIN: Status: RESOLVED | Noted: 2023-07-03 | Resolved: 2024-01-19

## 2024-01-19 PROBLEM — E66.3 OVERWEIGHT: Status: RESOLVED | Noted: 2023-07-03 | Resolved: 2024-01-19

## 2024-01-19 PROCEDURE — 1159F MED LIST DOCD IN RCRD: CPT | Performed by: INTERNAL MEDICINE

## 2024-01-19 PROCEDURE — 1126F AMNT PAIN NOTED NONE PRSNT: CPT | Performed by: INTERNAL MEDICINE

## 2024-01-19 PROCEDURE — 3008F BODY MASS INDEX DOCD: CPT | Performed by: INTERNAL MEDICINE

## 2024-01-19 PROCEDURE — 3078F DIAST BP <80 MM HG: CPT | Performed by: INTERNAL MEDICINE

## 2024-01-19 PROCEDURE — 1036F TOBACCO NON-USER: CPT | Performed by: INTERNAL MEDICINE

## 2024-01-19 PROCEDURE — 3077F SYST BP >= 140 MM HG: CPT | Performed by: INTERNAL MEDICINE

## 2024-01-19 PROCEDURE — 1160F RVW MEDS BY RX/DR IN RCRD: CPT | Performed by: INTERNAL MEDICINE

## 2024-01-19 PROCEDURE — 99213 OFFICE O/P EST LOW 20 MIN: CPT | Performed by: INTERNAL MEDICINE

## 2024-01-19 ASSESSMENT — ENCOUNTER SYMPTOMS
COUGH: 0
PALPITATIONS: 0
LIGHT-HEADEDNESS: 0
CHILLS: 0
SLEEP DISTURBANCE: 0
HEADACHES: 0
FREQUENCY: 0
RHINORRHEA: 0
VOMITING: 0
ACTIVITY CHANGE: 0
DECREASED CONCENTRATION: 0
SHORTNESS OF BREATH: 0
SINUS PRESSURE: 0
WEAKNESS: 0
ADENOPATHY: 0
ABDOMINAL PAIN: 0
NECK STIFFNESS: 0
VOICE CHANGE: 0
NERVOUS/ANXIOUS: 0
NAUSEA: 0
DYSPHORIC MOOD: 0
APPETITE CHANGE: 0
CHEST TIGHTNESS: 0
FEVER: 0
EYE ITCHING: 0
NECK PAIN: 0
FACIAL SWELLING: 0
WHEEZING: 0
JOINT SWELLING: 0
AGITATION: 0
FATIGUE: 0
ABDOMINAL DISTENTION: 0
MYALGIAS: 0
SINUS PAIN: 0
BRUISES/BLEEDS EASILY: 0
COLOR CHANGE: 0
DIZZINESS: 0
HEMATURIA: 0
ARTHRALGIAS: 1
CONSTIPATION: 0
DIARRHEA: 0
EYE DISCHARGE: 0
SORE THROAT: 0
DYSURIA: 0
HYPERACTIVE: 0

## 2024-01-19 NOTE — PROGRESS NOTES
Britt Guillaume comes in today to establish with a new PCP.      Ms. Guillaume comes in today to establish with a new primary care physician as her prior has left her practice.  She feels generally well.  She is followed for hypertension.  She recently was started on a statin for heterogeneous plaque in several vessels including subclavian's, though coronary calcium scoring scan was actually quite reassuring.  Blood pressure typically runs well in the 130s systolic with diastolics in the 80s.  She follows with cardiology.  She has had some left jaw and ear pain recently, this was hurting when she was eating, dentist did not find any abnormalities.  She does have a mouthguard that she is planning on picking up that may be beneficial.  She does take some ibuprofen for this, this has only been hurting for a few weeks now.  She denies any headaches, dizziness, chest pain, changes in her breathing, nausea, vomiting, nor changes in bowel or bladder habits.  She believes that she is up-to-date on her wellness visits, but per the chart may be a bit overdue.  She will plan on scheduling this with her next visit.        Review of Systems   Constitutional:  Negative for activity change, appetite change, chills, fatigue and fever.   HENT:  Negative for congestion, dental problem, ear pain, facial swelling, nosebleeds, postnasal drip, rhinorrhea, sinus pressure, sinus pain, sneezing, sore throat, tinnitus and voice change.         Left TMJ pain as per HPI   Eyes:  Negative for discharge, itching and visual disturbance.   Respiratory:  Negative for cough, chest tightness, shortness of breath and wheezing.    Cardiovascular:  Negative for chest pain, palpitations and leg swelling.   Gastrointestinal:  Negative for abdominal distention, abdominal pain, constipation, diarrhea, nausea and vomiting.   Endocrine: Negative for cold intolerance, heat intolerance and polyuria.   Genitourinary:  Negative for dysuria, frequency, hematuria,  urgency, vaginal bleeding and vaginal discharge.   Musculoskeletal:  Positive for arthralgias. Negative for gait problem, joint swelling, myalgias, neck pain and neck stiffness.   Skin:  Negative for color change, pallor and rash.   Allergic/Immunologic: Negative for environmental allergies, food allergies and immunocompromised state.   Neurological:  Negative for dizziness, syncope, weakness, light-headedness and headaches.   Hematological:  Negative for adenopathy. Does not bruise/bleed easily.   Psychiatric/Behavioral:  Negative for agitation, behavioral problems, decreased concentration, dysphoric mood and sleep disturbance. The patient is not nervous/anxious and is not hyperactive.        Objective   Physical Exam  Constitutional:       General: She is not in acute distress.     Appearance: Normal appearance. She is well-developed. She is not ill-appearing.   HENT:      Head: Normocephalic.      Right Ear: Tympanic membrane, ear canal and external ear normal. There is no impacted cerumen.      Left Ear: Tympanic membrane, ear canal and external ear normal. There is no impacted cerumen.      Nose: Nose normal. No congestion.      Mouth/Throat:      Mouth: Mucous membranes are moist.      Pharynx: Oropharynx is clear. No oropharyngeal exudate or posterior oropharyngeal erythema.   Eyes:      General: Lids are normal. No scleral icterus.     Extraocular Movements: Extraocular movements intact.      Conjunctiva/sclera: Conjunctivae normal.      Pupils: Pupils are equal, round, and reactive to light.   Neck:      Vascular: No carotid bruit.   Cardiovascular:      Rate and Rhythm: Normal rate and regular rhythm.      Pulses: Normal pulses.      Heart sounds: Murmur (2/6 DM RUSB, 2/6 SM apex) heard.   Pulmonary:      Effort: Pulmonary effort is normal. No respiratory distress.      Breath sounds: No wheezing, rhonchi or rales.   Abdominal:      General: Bowel sounds are normal. There is no distension.      Palpations:  Abdomen is soft. There is no mass.      Tenderness: There is no abdominal tenderness. There is no guarding or rebound.   Musculoskeletal:         General: No swelling, tenderness or signs of injury.      Cervical back: Normal range of motion and neck supple. No tenderness.      Right lower leg: No edema.      Left lower leg: No edema.   Lymphadenopathy:      Cervical: No cervical adenopathy.   Skin:     General: Skin is warm and dry.      Coloration: Skin is not pale.      Findings: No bruising, erythema or rash.   Neurological:      General: No focal deficit present.      Mental Status: She is alert and oriented to person, place, and time.      Cranial Nerves: No cranial nerve deficit.      Motor: No weakness.      Gait: Gait normal.      Deep Tendon Reflexes: Reflexes normal.   Psychiatric:         Mood and Affect: Mood normal.         Behavior: Behavior normal.         Judgment: Judgment normal.         Assessment/Plan   1.  Hypertension: Borderline control but typically running better at home.  BMP up-to-date.  Follows with cardiology as well.  Does not need refills at this time.  2.  Plaque with borderline lipids, now on statin.  Plan on repeating labs in the late spring with her wellness visit, also follows with cardiology.  3.  Cardiac murmur: Echo looked reassuring.  No evidence of significant valvular abnormalities.  4.  Likely TMJ: Have recommended symptomatic therapy with NSAIDs, heat alternating with ice, bite guard.  Keep us informed if this is not improving.    We will see her back in the late spring or early summer for her wellness visit.  She is to call with any questions prior to that time.  Problem List Items Addressed This Visit    None

## 2024-01-19 NOTE — PATIENT INSTRUCTIONS
It was a pleasure meeting you in the office today.  Please continue on your current medications and keep close follow-up with your cardiology specialist.  We will plan on seeing you back in the late spring or early summer for your wellness visit.  Please call if you have any questions prior to that time.

## 2024-02-09 ENCOUNTER — TELEPHONE (OUTPATIENT)
Dept: PRIMARY CARE | Facility: CLINIC | Age: 69
End: 2024-02-09

## 2024-02-12 DIAGNOSIS — M26.609 TMJ (TEMPOROMANDIBULAR JOINT SYNDROME): Primary | ICD-10-CM

## 2024-02-12 RX ORDER — METAXALONE 800 MG/1
800 TABLET ORAL NIGHTLY PRN
Qty: 30 TABLET | Refills: 1 | Status: SHIPPED
Start: 2024-02-12 | End: 2024-02-15

## 2024-02-12 NOTE — TELEPHONE ENCOUNTER
Spoke to patient again, now states that the Tizanidine makes her too drowsy and if she takes it at night she doesn't sleep well. . Explained to her that muscle relaxer's may make you a little drowsy but still would like to know if she can try another one. Thanks

## 2024-02-15 DIAGNOSIS — M26.652 ARTHROPATHY OF LEFT TEMPOROMANDIBULAR JOINT: Primary | ICD-10-CM

## 2024-02-15 RX ORDER — CYCLOBENZAPRINE HCL 5 MG
5 TABLET ORAL NIGHTLY PRN
Qty: 15 TABLET | Refills: 0 | Status: SHIPPED | OUTPATIENT
Start: 2024-02-15 | End: 2024-04-15

## 2024-02-28 ENCOUNTER — OFFICE VISIT (OUTPATIENT)
Dept: PRIMARY CARE | Facility: CLINIC | Age: 69
End: 2024-02-28
Payer: MEDICARE

## 2024-02-28 VITALS
DIASTOLIC BLOOD PRESSURE: 77 MMHG | WEIGHT: 186.6 LBS | BODY MASS INDEX: 31.05 KG/M2 | HEART RATE: 67 BPM | SYSTOLIC BLOOD PRESSURE: 146 MMHG

## 2024-02-28 DIAGNOSIS — H60.92 OTITIS EXTERNA OF LEFT EAR, UNSPECIFIED CHRONICITY, UNSPECIFIED TYPE: Primary | ICD-10-CM

## 2024-02-28 DIAGNOSIS — H92.02 LEFT EAR PAIN: ICD-10-CM

## 2024-02-28 PROBLEM — G44.89 OTHER HEADACHE SYNDROME: Status: ACTIVE | Noted: 2024-02-28

## 2024-02-28 PROCEDURE — 1160F RVW MEDS BY RX/DR IN RCRD: CPT | Performed by: INTERNAL MEDICINE

## 2024-02-28 PROCEDURE — 3008F BODY MASS INDEX DOCD: CPT | Performed by: INTERNAL MEDICINE

## 2024-02-28 PROCEDURE — 1159F MED LIST DOCD IN RCRD: CPT | Performed by: INTERNAL MEDICINE

## 2024-02-28 PROCEDURE — 3078F DIAST BP <80 MM HG: CPT | Performed by: INTERNAL MEDICINE

## 2024-02-28 PROCEDURE — 99213 OFFICE O/P EST LOW 20 MIN: CPT | Performed by: INTERNAL MEDICINE

## 2024-02-28 PROCEDURE — 3077F SYST BP >= 140 MM HG: CPT | Performed by: INTERNAL MEDICINE

## 2024-02-28 PROCEDURE — 1036F TOBACCO NON-USER: CPT | Performed by: INTERNAL MEDICINE

## 2024-02-28 PROCEDURE — 1126F AMNT PAIN NOTED NONE PRSNT: CPT | Performed by: INTERNAL MEDICINE

## 2024-02-28 RX ORDER — AMOXICILLIN 875 MG/1
875 TABLET, FILM COATED ORAL 2 TIMES DAILY
Qty: 20 TABLET | Refills: 0 | Status: SHIPPED
Start: 2024-02-28 | End: 2024-03-12 | Stop reason: ALTCHOICE

## 2024-02-28 RX ORDER — CIPROFLOXACIN AND DEXAMETHASONE 3; 1 MG/ML; MG/ML
4 SUSPENSION/ DROPS AURICULAR (OTIC) 2 TIMES DAILY
Qty: 7.5 ML | Refills: 0 | Status: SHIPPED
Start: 2024-02-28 | End: 2024-03-01

## 2024-02-28 ASSESSMENT — ENCOUNTER SYMPTOMS
DIZZINESS: 0
SORE THROAT: 0
EYE ITCHING: 0
LIGHT-HEADEDNESS: 0
PHOTOPHOBIA: 0
HEADACHES: 1
SINUS PRESSURE: 0
RHINORRHEA: 0
FEVER: 0
SINUS PAIN: 0
VOICE CHANGE: 0
EYE DISCHARGE: 0

## 2024-02-28 NOTE — PROGRESS NOTES
Britt Guillaume comes in today for persistent ear pain.      Ms. Guillaume comes in today with persistent left ear pain.  She was seen initially about 1 month ago, had some jaw and ear pain related to chewing, evaluation was reassuring.  Symptoms persisted despite NSAIDs and muscle relaxants.  Fluticasone nasal spray was also recommended, it is unclear how consistently she tried this.  She has called several times over the past month with persistent symptoms and it has been recommended that she return for evaluation.  She will occasionally have a twinge of pain in her head as well just above her ear, she states that this occurs essentially daily, lasting for about an hour at a time.  She denies any frontal headaches nor vision changes associated with this.  She has been using a mouthguard.  She is tries ibuprofen and an ice pack and this will help.  She even used sweet oil recently and found that this helps.  Sometimes she has no pain at all.  Earlier today she noticed pain on the upper aspect of her earlobe, this resolved.  There is no pain in her jaw at this time.  Notably, she has an appointment for her wellness visit in 2 weeks.    Earache   Associated symptoms include headaches. Pertinent negatives include no rhinorrhea or sore throat.   Headache   Associated symptoms include ear pain. Pertinent negatives include no dizziness, fever, photophobia, rhinorrhea, sinus pressure, sore throat or tinnitus.       Review of Systems   Constitutional:  Negative for fever.   HENT:  Positive for ear pain. Negative for congestion, rhinorrhea, sinus pressure, sinus pain, sneezing, sore throat, tinnitus and voice change.    Eyes:  Negative for photophobia, discharge, itching and visual disturbance.   Neurological:  Positive for headaches. Negative for dizziness and light-headedness.       Objective   Physical Exam  Constitutional:       Appearance: Normal appearance.   HENT:      Right Ear: Tympanic membrane, ear canal and  external ear normal. There is no impacted cerumen.      Left Ear: External ear normal. There is no impacted cerumen.      Ears:      Comments: Possibly slight TM erythema, no inflammation.  Slight external canal redness.     Nose: Nose normal. No congestion.      Mouth/Throat:      Pharynx: No oropharyngeal exudate or posterior oropharyngeal erythema.   Eyes:      General: No scleral icterus.     Conjunctiva/sclera: Conjunctivae normal.      Pupils: Pupils are equal, round, and reactive to light.   Musculoskeletal:      Cervical back: No tenderness.   Lymphadenopathy:      Cervical: No cervical adenopathy.   Neurological:      Mental Status: She is alert.         Assessment/Plan   1.  Persistent left ear pain: Unclear whether there is a slight underlying infectious process.  Reasonable to give trial of amoxicillin as well as eardrops for the external canal.  These have been both sent to her pharmacy.  If headache intensifies, she needs to contact us and we would need to consider labs for TA.  If symptoms persistent despite antibiotic therapy, we would consider ENT referral and also consider adding labs with her upcoming wellness visit such as a sed rate.  She will keep us updated especially if symptoms intensify.  Otherwise, we will be following up in 2 weeks for her wellness visit.  Problem List Items Addressed This Visit    None

## 2024-02-28 NOTE — PATIENT INSTRUCTIONS
We will treat with both eardrops and an oral antibiotic twice daily for the next 7 to 10 days.  Please let us know if symptoms acutely worsen.  Otherwise, we will follow-up at the time of your wellness visit in 2 weeks.  If symptoms persistent we will consider further evaluation as well as ENT referral.  Please call with any questions.

## 2024-02-29 ENCOUNTER — TELEPHONE (OUTPATIENT)
Dept: PRIMARY CARE | Facility: CLINIC | Age: 69
End: 2024-02-29

## 2024-03-01 DIAGNOSIS — H92.02 LEFT EAR PAIN: Primary | ICD-10-CM

## 2024-03-01 RX ORDER — NEOMYCIN SULFATE, POLYMYXIN B SULFATE, HYDROCORTISONE 3.5; 10000; 1 MG/ML; [USP'U]/ML; MG/ML
2 SOLUTION/ DROPS AURICULAR (OTIC) 4 TIMES DAILY
Qty: 10 ML | Refills: 0 | Status: SHIPPED
Start: 2024-03-01 | End: 2024-03-12 | Stop reason: ALTCHOICE

## 2024-03-06 NOTE — PROGRESS NOTES
Primary Care Physician: Mayte Guzman MD      Date of Visit: 03/07/2024 10:00 AM EST  Location of visit: [facility]   Type of Visit: Established Patient     HPI / Summary:   Pt is a 68 year old woman with HTN, IBS, prediabetes h/o acid reflux referred for heart murmur found not to have any significant valvular pathology who returns for follow up.     CAD risk factors: + HTN, + prediabetes, no hyperlipidemia, + FHx CAD ( brother with MI in early 60s) nonsmoker  Had knee surgery in March 2023  As part of her preop assessment given presence of heart murmur she had cardiac echo ( pt states she has had an murmur for years)   Echo March 15 2023: normal LV size and systolic function with LVEF 65-70%. Mild LVH. report and images reviewed- AV not well seen, no MR no TR . Overall no valvular abnormalities.   Pt thought she was having allergy issues when she was on lisinopril, however since changing to losartan her cough has resolved. Her weight has been stable and her BP at home more recently has been running 140s/670s mmHg.  She used to do water aerobics but not doing recently. She does some exercises on TV and uses a foot pedal but says her exercise is not consistent. She does aim to get 10k staeps daily, not always reaching her goal. She has no myalgias on rosuvastatin.  Has no palpitations presyncope or syncope or dizziness.   For CAD risk assessment had CT for CAC 9/29/2023: CAC=0. She does have known carotid plaques thog no significant stenoses and is on a statin for her carotid plaque.     ROS: Full 10 pt review of symptoms of negative unless discussed above.     Problems:   Patient Active Problem List   Diagnosis    Constipation    Abnormality of left breast on screening mammogram    Acid reflux    Dysesthesia    HTN (hypertension)    Irritable bowel    Lumbosacral radiculitis    Sensorineural hearing loss (SNHL) of right ear with unrestricted hearing of left ear    Bruit of left carotid artery    Heart murmur  "   Atherosclerotic plaque    Other headache syndrome     Medical History:   Past Medical History:   Diagnosis Date    Sensory disturbance 2023     Surgical Hx:   Past Surgical History:   Procedure Laterality Date     SECTION, CLASSIC  2018     Section    HAND SURGERY      KNEE ARTHROPLASTY      KNEE SURGERY  2018    Knee Surgery Left    TOTAL ABDOMINAL HYSTERECTOMY  06/10/2015    Total Abdominal Hysterectomy      Social Hx:   Tobacco Use: Medium Risk (3/7/2024)    Patient History     Smoking Tobacco Use: Former     Smokeless Tobacco Use: Never     Passive Exposure: Not on file     Alcohol Use: Not At Risk (2023)    AUDIT-C     Frequency of Alcohol Consumption: Never     Average Number of Drinks: Patient does not drink     Frequency of Binge Drinking: Never     Family Hx:   Family History   Problem Relation Name Age of Onset    Hypertension Mother      Hypertension Father      Hypertension Sister      Hypertension Brother      Prostate cancer Brother      Other (bladder cancer) Brother      Heart failure Brother      Hypertension Sibling        Exam:   Vitals:   Vitals:    24 1016 24 1041   BP: 173/84 150/78   BP Location: Left arm    Patient Position: Sitting    Pulse: 58    SpO2: 94%    Weight: 84.5 kg (186 lb 4.8 oz)    Height: 1.651 m (5' 5\")      Wt Readings from Last 5 Encounters:   24 84.5 kg (186 lb 4.8 oz)   24 84.6 kg (186 lb 9.6 oz)   24 84.5 kg (186 lb 3.2 oz)   23 84.2 kg (185 lb 9.6 oz)   10/20/23 83.5 kg (184 lb)      Constitutional:       Appearance: Healthy appearance. Not in distress.   Pulmonary:      Effort: Pulmonary effort is normal.      Breath sounds: Normal breath sounds.   Cardiovascular:      Normal rate. Regular rhythm. S1 with normal intensity. S2 with normal intensity.       Murmurs: There is a grade 1/6 systolic murmur at the ULSB.   Edema:     Peripheral edema absent.   Abdominal:      General: Bowel sounds are " normal.      Palpations: Abdomen is soft.   Neurological:      Mental Status: Alert and oriented to person, place and time.       Labs:   Recent Labs     03/16/21  1419   WBC 9.7   HGB 13.3   HCT 40.7      MCV 89     Recent Labs     11/20/23  1204 03/15/23  0946 10/17/22  1006 05/06/21  1159 03/16/21  1419 06/10/20  1523 02/01/19  1009    139 140 141 140 141 139   K 3.5 3.4* 3.8 3.5 3.5 3.5 3.4*    100 101 101 101 101 101   BUN 16 14 13 12 15 14 11   CREATININE 0.85 0.81 0.81 0.83 0.89 0.92 0.73      Recent Labs     11/20/23  1204   HGBA1C 5.3     Lab Results   Component Value Date    CHOL 174 11/20/2023    HDL 50.0 11/20/2023    LDLF 124 (H) 10/17/2022    TRIG 69 11/20/2023   LDL 11//20/2023: 110    Notable Studies: imaging personally reviewed and summarized by me below  EKG:  Encounter Date: 03/07/24   ECG 12 lead (Clinic Performed)   Result Value    Ventricular Rate 58    Atrial Rate 58    IN Interval 192    QRS Duration 92    QT Interval 432    QTC Calculation(Bazett) 424    P Axis 70    R Axis 38    T Axis 65    QRS Count 10    Q Onset 220    P Onset 124    P Offset 180    T Offset 436    QTC Fredericia 426    Narrative    Sinus bradycardia  Otherwise normal ECG  When compared with ECG of 14-AUG-2023 08:11,  No significant change was found     Echo:  - Echo (3/15/2023)   PHYSICIAN INTERPRETATION:  Left Ventricle: The left ventricular systolic function is normal, with an estimated ejection fraction of 65-70%. There are no regional wall motion abnormalities. The left ventricular cavity size is normal. There is mild left ventricular hypertrophy. Spectral Doppler shows a normal pattern of left ventricular diastolic filling.  Left Atrium: The left atrium is normal in size.  Right Ventricle: The right ventricle is normal in size. There is normal right ventricular global systolic function.  Right Atrium: The right atrium is normal in size.  Aortic Valve: The aortic valve appears structurally normal.  There is no evidence of aortic valve regurgitation. The peak instantaneous gradient of the aortic valve is 12.2 mmHg. The mean gradient of the aortic valve is 5.8 mmHg.  Mitral Valve: The mitral valve is normal in structure. There is no evidence of mitral valve regurgitation.  Tricuspid Valve: The tricuspid valve is structurally normal. No evidence of tricuspid regurgitation.  Pulmonic Valve: The pulmonic valve is structurally normal. There is no indication of pulmonic valve regurgitation.  Pericardium: There is no pericardial effusion noted.  Aorta: The aortic root is normal.  In comparison to the previous echocardiogram(s): There are no prior studies on this patient for comparison purposes.     CONCLUSIONS:  1. Left ventricular systolic function is normal with a 65-70% estimated ejection fraction.  2. No valvular abnormalities.     Carotid artery Dopplers 8/14/2023  CONCLUSIONS:  Right Carotid: Findings are consistent with less than 50% stenosis of the right proximal internal carotid artery. Laminar flow seen by color Doppler. Right external carotid artery appears patent with no evidence of stenosis. No evidence of hemodynamically significant stenosis of the right common carotid artery. The right vertebral artery is patent with antegrade flow. No evidence of hemodynamically significant stenosis in the right subclavian artery.  Left Carotid: Findings are consistent with less than 50% stenosis of the left proximal internal carotid artery. Laminar flow seen by color Doppler. Left external carotid artery appears patent with no evidence of stenosis. No evidence of hemodynamically significant stenosis of the left common carotid artery. The left vertebral artery is patent with antegrade flow. No evidence of hemodynamically significant stenosis in the left subclavian artery.     Imaging & Doppler Findings:  Right Plaque Morph: The proximal right subclavian artery demonstrates heterogenous plaque. The right carotid bulb  demonstrates heterogenous plaque.  Left Plaque Morph: The proximal left internal carotid artery demonstrates heterogenous plaque. The left carotid bulb demonstrates heterogenous plaque.    Current Outpatient Medications   Medication Instructions    amLODIPine (NORVASC) 5 mg, oral, Daily    cyclobenzaprine (FLEXERIL) 5 mg, oral, Nightly PRN    hydroCHLOROthiazide (HYDRODiuril) 25 mg tablet 1 tablet, oral, Daily    losartan (COZAAR) 100 mg, oral, Daily    meloxicam (Mobic) 15 mg tablet 1 tablet, oral, Daily PRN    pantoprazole (PROTONIX) 40 mg, oral, Daily before breakfast, (30 min to 1 hour before breakfast).    rosuvastatin (CRESTOR) 10 mg, oral, Daily     Impressions and Plan:    Pt is a beata 68 year old woman with HTN prediabetes, FHx of CAD and heart murmur who comes for follow up. Her recent cardiac echo shows no valvular pathology and she is active without any cardiac sx. Given no valvular disease likely functional flow murmur. No rx needed. For her BP control she is on losartan 50 mg daily amlodipine 5 mg and and HCTZ 25 mg daily . Her BP in the office today as well as at home has been running high. Will adjust timing of meds so that not all taken at once so do not all wear off simultaneously. In addition will increase the losartan to 100 mg daily. To follow home BP and HR and log for review. To call office in 2 weeks to review and adjust further as needed.   Pt with left carotid bruit- vascular studies to assess showed no significant carotid stenoses, but do show plaque which would benefit from statin rx. .   Plan  1. cardiac murmur- no valvular pathology on echo  2. HTN- Continue  amlodipine  5 mg daily,  HCTZ 25 mg daily and will increase losartan to 100 mg daily. Home BP and HR checks for review. To call office in 2 weeks to review BP  3. CAD risk - CT scan for CAC shoed CAC-0. No indication for statin at this time from a cardiac perspective, however should be on statin for carotid disease.   4. carotid  bruit (L)- no significant stenoses on vascular studies. Reviewed these studies with Dr STANTON Diaz( head of vascularllab) who felt that although no significant stenoses, there are significant enough heterogeneous plaque that she would benefit from statin rx from a vascular perspective. Will continue  rosuvastatin 10 mg daily . Due for recheck of fasting lipids  5. H/o prediabetes- on recheck of HgA1C, has normalized.  return to clinic in 3 months.      Patient Instructions:  If you have any questions or need cardiac medication refills, please call my office at [phone],      To reach my office please call [phone]  To schedule an appointment call [phone].          ____________________________________________________________  Fiordaliza Tena MD  Division of Cardiovascular Medicine  [facility] [place]  [facility]

## 2024-03-07 ENCOUNTER — OFFICE VISIT (OUTPATIENT)
Dept: CARDIOLOGY | Facility: HOSPITAL | Age: 69
End: 2024-03-07
Payer: MEDICARE

## 2024-03-07 VITALS
SYSTOLIC BLOOD PRESSURE: 150 MMHG | OXYGEN SATURATION: 94 % | HEIGHT: 65 IN | WEIGHT: 186.3 LBS | HEART RATE: 58 BPM | BODY MASS INDEX: 31.04 KG/M2 | DIASTOLIC BLOOD PRESSURE: 78 MMHG

## 2024-03-07 DIAGNOSIS — I10 HYPERTENSION, UNSPECIFIED TYPE: ICD-10-CM

## 2024-03-07 DIAGNOSIS — R01.1 HEART MURMUR: ICD-10-CM

## 2024-03-07 DIAGNOSIS — I70.90 ATHEROSCLEROTIC PLAQUE: Primary | ICD-10-CM

## 2024-03-07 PROCEDURE — 93005 ELECTROCARDIOGRAM TRACING: CPT | Performed by: INTERNAL MEDICINE

## 2024-03-07 PROCEDURE — 99214 OFFICE O/P EST MOD 30 MIN: CPT | Performed by: INTERNAL MEDICINE

## 2024-03-07 PROCEDURE — 1160F RVW MEDS BY RX/DR IN RCRD: CPT | Performed by: INTERNAL MEDICINE

## 2024-03-07 PROCEDURE — 3078F DIAST BP <80 MM HG: CPT | Performed by: INTERNAL MEDICINE

## 2024-03-07 PROCEDURE — 3077F SYST BP >= 140 MM HG: CPT | Performed by: INTERNAL MEDICINE

## 2024-03-07 PROCEDURE — 1036F TOBACCO NON-USER: CPT | Performed by: INTERNAL MEDICINE

## 2024-03-07 PROCEDURE — 3008F BODY MASS INDEX DOCD: CPT | Performed by: INTERNAL MEDICINE

## 2024-03-07 PROCEDURE — 1159F MED LIST DOCD IN RCRD: CPT | Performed by: INTERNAL MEDICINE

## 2024-03-07 PROCEDURE — 1126F AMNT PAIN NOTED NONE PRSNT: CPT | Performed by: INTERNAL MEDICINE

## 2024-03-07 RX ORDER — LOSARTAN POTASSIUM 100 MG/1
100 TABLET ORAL DAILY
Qty: 90 TABLET | Refills: 3 | Status: SHIPPED | OUTPATIENT
Start: 2024-03-07 | End: 2025-03-07

## 2024-03-07 RX ORDER — AMLODIPINE BESYLATE 5 MG/1
5 TABLET ORAL DAILY
Qty: 90 TABLET | Refills: 3 | Status: SHIPPED | OUTPATIENT
Start: 2024-03-07 | End: 2025-03-07

## 2024-03-07 NOTE — PATIENT INSTRUCTIONS
1. High blood pressure- BP high today, - will adjust medications- Continue amlodipine to 5 mg daily and  HCTZ 25 mg daily ( take both in the AM) and increase  losartan to 100 mg daily , and take the losartan in the PM ( to try to smooth out BP readings. Please check daily blood pressure and heart rate and log for review. Please call my office in 2 weeks to review BP readings to allow medications to be adjusted appropriately. Can take 2 of your 50 mg tablets of losartan  until your current supply is done.   2. Heart murmur- echo with no significant valvular pathology  3. Left carotid bruit- no significant stenoses- will continue rosuvastatin 10 mg daily with good response for carotid plaques.   4. Family history of CAD ( brother with MI in early 60s. ) -CT scan for CAC showed calcium score of 0   5. H/o prediabetes- on recheck HgA1C, now in normal range.   Blood work to be done- fasting lipid panel and CMP  Return to clinic in 3 months. Pt to call office in 2 weeks with BP readings.

## 2024-03-12 ENCOUNTER — LAB (OUTPATIENT)
Dept: LAB | Facility: LAB | Age: 69
End: 2024-03-12
Payer: MEDICARE

## 2024-03-12 ENCOUNTER — OFFICE VISIT (OUTPATIENT)
Dept: PRIMARY CARE | Facility: CLINIC | Age: 69
End: 2024-03-12
Payer: MEDICARE

## 2024-03-12 VITALS
SYSTOLIC BLOOD PRESSURE: 154 MMHG | WEIGHT: 187 LBS | DIASTOLIC BLOOD PRESSURE: 70 MMHG | BODY MASS INDEX: 31.12 KG/M2 | HEART RATE: 64 BPM

## 2024-03-12 DIAGNOSIS — E55.9 VITAMIN D DEFICIENCY: ICD-10-CM

## 2024-03-12 DIAGNOSIS — I10 HYPERTENSION, UNSPECIFIED TYPE: ICD-10-CM

## 2024-03-12 DIAGNOSIS — Z00.00 ROUTINE GENERAL MEDICAL EXAMINATION AT HEALTH CARE FACILITY: Primary | ICD-10-CM

## 2024-03-12 DIAGNOSIS — Z12.31 ENCOUNTER FOR SCREENING MAMMOGRAM FOR MALIGNANT NEOPLASM OF BREAST: ICD-10-CM

## 2024-03-12 DIAGNOSIS — G44.89 OTHER HEADACHE SYNDROME: ICD-10-CM

## 2024-03-12 DIAGNOSIS — Z78.0 POSTMENOPAUSAL ESTROGEN DEFICIENCY: ICD-10-CM

## 2024-03-12 DIAGNOSIS — M54.17 LUMBOSACRAL RADICULITIS: ICD-10-CM

## 2024-03-12 DIAGNOSIS — H92.02 LEFT EAR PAIN: ICD-10-CM

## 2024-03-12 DIAGNOSIS — I70.90 ATHEROSCLEROTIC PLAQUE: ICD-10-CM

## 2024-03-12 LAB
25(OH)D3 SERPL-MCNC: 41 NG/ML (ref 30–100)
ALBUMIN SERPL BCP-MCNC: 4.2 G/DL (ref 3.4–5)
ALP SERPL-CCNC: 96 U/L (ref 33–136)
ALT SERPL W P-5'-P-CCNC: 16 U/L (ref 7–45)
ANION GAP SERPL CALC-SCNC: 11 MMOL/L (ref 10–20)
AST SERPL W P-5'-P-CCNC: 23 U/L (ref 9–39)
BASOPHILS # BLD AUTO: 0.05 X10*3/UL (ref 0–0.1)
BASOPHILS NFR BLD AUTO: 0.6 %
BILIRUB SERPL-MCNC: 0.8 MG/DL (ref 0–1.2)
BUN SERPL-MCNC: 17 MG/DL (ref 6–23)
CALCIUM SERPL-MCNC: 10.3 MG/DL (ref 8.6–10.6)
CHLORIDE SERPL-SCNC: 102 MMOL/L (ref 98–107)
CHOLEST SERPL-MCNC: 132 MG/DL (ref 0–199)
CHOLESTEROL/HDL RATIO: 2.5
CK SERPL-CCNC: 140 U/L (ref 0–215)
CO2 SERPL-SCNC: 32 MMOL/L (ref 21–32)
CREAT SERPL-MCNC: 0.86 MG/DL (ref 0.5–1.05)
CREAT UR-MCNC: 129.5 MG/DL (ref 20–320)
EGFRCR SERPLBLD CKD-EPI 2021: 75 ML/MIN/1.73M*2
EOSINOPHIL # BLD AUTO: 0.18 X10*3/UL (ref 0–0.7)
EOSINOPHIL NFR BLD AUTO: 2.1 %
ERYTHROCYTE [DISTWIDTH] IN BLOOD BY AUTOMATED COUNT: 14.4 % (ref 11.5–14.5)
ERYTHROCYTE [SEDIMENTATION RATE] IN BLOOD BY WESTERGREN METHOD: 24 MM/H (ref 0–30)
GLUCOSE SERPL-MCNC: 94 MG/DL (ref 74–99)
HCT VFR BLD AUTO: 39.4 % (ref 36–46)
HDLC SERPL-MCNC: 52.2 MG/DL
HGB BLD-MCNC: 12.8 G/DL (ref 12–16)
IMM GRANULOCYTES # BLD AUTO: 0.03 X10*3/UL (ref 0–0.7)
IMM GRANULOCYTES NFR BLD AUTO: 0.4 % (ref 0–0.9)
LDLC SERPL CALC-MCNC: 68 MG/DL
LYMPHOCYTES # BLD AUTO: 1.86 X10*3/UL (ref 1.2–4.8)
LYMPHOCYTES NFR BLD AUTO: 21.7 %
MCH RBC QN AUTO: 28.8 PG (ref 26–34)
MCHC RBC AUTO-ENTMCNC: 32.5 G/DL (ref 32–36)
MCV RBC AUTO: 89 FL (ref 80–100)
MICROALBUMIN UR-MCNC: 7.3 MG/L
MICROALBUMIN/CREAT UR: 5.6 UG/MG CREAT
MONOCYTES # BLD AUTO: 0.51 X10*3/UL (ref 0.1–1)
MONOCYTES NFR BLD AUTO: 6 %
NEUTROPHILS # BLD AUTO: 5.94 X10*3/UL (ref 1.2–7.7)
NEUTROPHILS NFR BLD AUTO: 69.2 %
NON HDL CHOLESTEROL: 80 MG/DL (ref 0–149)
NRBC BLD-RTO: 0 /100 WBCS (ref 0–0)
PLATELET # BLD AUTO: 240 X10*3/UL (ref 150–450)
POTASSIUM SERPL-SCNC: 3.5 MMOL/L (ref 3.5–5.3)
PROT SERPL-MCNC: 7.1 G/DL (ref 6.4–8.2)
RBC # BLD AUTO: 4.44 X10*6/UL (ref 4–5.2)
SODIUM SERPL-SCNC: 141 MMOL/L (ref 136–145)
TRIGL SERPL-MCNC: 60 MG/DL (ref 0–149)
TSH SERPL-ACNC: 1.29 MIU/L (ref 0.44–3.98)
VIT B12 SERPL-MCNC: 475 PG/ML (ref 211–911)
VLDL: 12 MG/DL (ref 0–40)
WBC # BLD AUTO: 8.6 X10*3/UL (ref 4.4–11.3)

## 2024-03-12 PROCEDURE — 1160F RVW MEDS BY RX/DR IN RCRD: CPT | Performed by: INTERNAL MEDICINE

## 2024-03-12 PROCEDURE — 1126F AMNT PAIN NOTED NONE PRSNT: CPT | Performed by: INTERNAL MEDICINE

## 2024-03-12 PROCEDURE — 1036F TOBACCO NON-USER: CPT | Performed by: INTERNAL MEDICINE

## 2024-03-12 PROCEDURE — 80061 LIPID PANEL: CPT

## 2024-03-12 PROCEDURE — 3078F DIAST BP <80 MM HG: CPT | Performed by: INTERNAL MEDICINE

## 2024-03-12 PROCEDURE — 3077F SYST BP >= 140 MM HG: CPT | Performed by: INTERNAL MEDICINE

## 2024-03-12 PROCEDURE — 82550 ASSAY OF CK (CPK): CPT

## 2024-03-12 PROCEDURE — 80053 COMPREHEN METABOLIC PANEL: CPT

## 2024-03-12 PROCEDURE — 1159F MED LIST DOCD IN RCRD: CPT | Performed by: INTERNAL MEDICINE

## 2024-03-12 PROCEDURE — 1170F FXNL STATUS ASSESSED: CPT | Performed by: INTERNAL MEDICINE

## 2024-03-12 PROCEDURE — G0439 PPPS, SUBSEQ VISIT: HCPCS | Performed by: INTERNAL MEDICINE

## 2024-03-12 PROCEDURE — 36415 COLL VENOUS BLD VENIPUNCTURE: CPT

## 2024-03-12 PROCEDURE — 82043 UR ALBUMIN QUANTITATIVE: CPT

## 2024-03-12 PROCEDURE — 85652 RBC SED RATE AUTOMATED: CPT

## 2024-03-12 PROCEDURE — 84443 ASSAY THYROID STIM HORMONE: CPT

## 2024-03-12 PROCEDURE — 82306 VITAMIN D 25 HYDROXY: CPT

## 2024-03-12 PROCEDURE — 99214 OFFICE O/P EST MOD 30 MIN: CPT | Performed by: INTERNAL MEDICINE

## 2024-03-12 PROCEDURE — 82607 VITAMIN B-12: CPT

## 2024-03-12 PROCEDURE — 85025 COMPLETE CBC W/AUTO DIFF WBC: CPT

## 2024-03-12 PROCEDURE — 82570 ASSAY OF URINE CREATININE: CPT

## 2024-03-12 PROCEDURE — 3008F BODY MASS INDEX DOCD: CPT | Performed by: INTERNAL MEDICINE

## 2024-03-12 ASSESSMENT — ENCOUNTER SYMPTOMS
OCCASIONAL FEELINGS OF UNSTEADINESS: 0
ACTIVITY CHANGE: 0
DYSURIA: 0
ADENOPATHY: 0
DIARRHEA: 0
NAUSEA: 0
WHEEZING: 0
FATIGUE: 0
ARTHRALGIAS: 0
NERVOUS/ANXIOUS: 0
LIGHT-HEADEDNESS: 0
DECREASED CONCENTRATION: 0
SINUS PAIN: 0
VOICE CHANGE: 0
LOSS OF SENSATION IN FEET: 0
CHEST TIGHTNESS: 0
FREQUENCY: 0
RHINORRHEA: 0
NUMBNESS: 0
WEAKNESS: 0
SLEEP DISTURBANCE: 0
HEADACHES: 1
VOMITING: 0
NECK PAIN: 0
SHORTNESS OF BREATH: 0
COUGH: 0
HYPERACTIVE: 0
EYE DISCHARGE: 0
DIZZINESS: 0
DIAPHORESIS: 0
DEPRESSION: 0
COLOR CHANGE: 0
ABDOMINAL PAIN: 0
SORE THROAT: 0
ABDOMINAL DISTENTION: 0
BRUISES/BLEEDS EASILY: 0
SINUS PRESSURE: 0
CONSTIPATION: 0
MYALGIAS: 0
EYE ITCHING: 0
PALPITATIONS: 0
BACK PAIN: 0
NECK STIFFNESS: 0
CHILLS: 0

## 2024-03-12 ASSESSMENT — ACTIVITIES OF DAILY LIVING (ADL)
DOING_HOUSEWORK: INDEPENDENT
DRESSING: INDEPENDENT
TAKING_MEDICATION: INDEPENDENT
BATHING: INDEPENDENT
GROCERY_SHOPPING: INDEPENDENT
MANAGING_FINANCES: INDEPENDENT

## 2024-03-12 ASSESSMENT — PATIENT HEALTH QUESTIONNAIRE - PHQ9
SUM OF ALL RESPONSES TO PHQ9 QUESTIONS 1 AND 2: 0
2. FEELING DOWN, DEPRESSED OR HOPELESS: NOT AT ALL
1. LITTLE INTEREST OR PLEASURE IN DOING THINGS: NOT AT ALL

## 2024-03-12 NOTE — PATIENT INSTRUCTIONS
We will follow up on all comprehensive blood work once results are available and make any recommendations based on these results as may be indicated.  Please continue with routine gynecologic exams and annual mammograms.  Please consider scheduling a baseline bone density scan as well.  Colonoscopy remains up-to-date from 2022, recommended to be repeated in 5 years total.  Thank you for keeping up with routine vaccines.  A referral has been placed to an ear specialist for the ongoing symptoms.  Please continue to check in with your cardiology specialist regarding the blood pressure, as further dose adjustments may be needed.  If you have any questions or need additional refills, please feel free to contact us.  Otherwise, we will plan on seeing you back in 6 months for brief follow-up.

## 2024-03-12 NOTE — PROGRESS NOTES
Subjective   Reason for Visit: Britt Guillaume is an 68 y.o. female patient comes in today for comprehensive follow-up of medical conditions in conjunction with annual wellness visit    Ms. Guillaume comes in today for comprehensive follow-up of medical conditions in conjunction with annual wellness visit, dictated in a separate note.  Please refer to that note for details on healthcare maintenance and screening studies.    Ms. Guillaume comes in today for comprehensive follow-up.  She has had some improvement in the left ear, but this still throbs periodically, off and on, a few days ago this was quite significant, today is better.  She still is having intermittent headaches mainly on the left side and would like a referral to a specialist at this stage.  Blood pressure has remained elevated both at home and in doctors offices, her cardiologist last week increased her losartan to 100 mg daily and she is to follow-up with them in a few weeks.  If still elevated, they plan on making further adjustments in medications.  She is planning on updating comprehensive blood work this morning Ketan is due for her mammogram and a baseline bone density scan.  She does continue to follow with her specialists, but is overdue on her gynecologic exam and plans on scheduling back with her gynecologist shortly.  She keeps up with routine vaccines, unclear whether she has had the recombinant Shingrix, she thought yes but there is no documentation of this.  She continues to stay active and denies any additional concerns, still frustrated with the left ear and headache pain.        Patient Care Team:  Mayte Guzman MD as PCP - General (Internal Medicine)  Mayte Guzman MD as PCP - [employer] PCP     Review of Systems   Constitutional:  Negative for activity change, chills, diaphoresis and fatigue.   HENT:  Positive for ear pain. Negative for congestion, ear discharge, hearing loss, postnasal drip, rhinorrhea, sinus pressure, sinus  pain, sneezing, sore throat, tinnitus and voice change.    Eyes:  Negative for discharge, itching and visual disturbance.   Respiratory:  Negative for cough, chest tightness, shortness of breath and wheezing.    Cardiovascular:  Negative for chest pain, palpitations and leg swelling.   Gastrointestinal:  Negative for abdominal distention, abdominal pain, constipation, diarrhea, nausea and vomiting.   Endocrine: Negative for cold intolerance, heat intolerance and polyuria.   Genitourinary:  Negative for dysuria, frequency, urgency, vaginal bleeding and vaginal discharge.   Musculoskeletal:  Negative for arthralgias, back pain, myalgias, neck pain and neck stiffness.   Skin:  Negative for color change, pallor and rash.   Allergic/Immunologic: Negative for environmental allergies, food allergies and immunocompromised state.   Neurological:  Positive for headaches. Negative for dizziness, syncope, weakness, light-headedness and numbness.   Hematological:  Negative for adenopathy. Does not bruise/bleed easily.   Psychiatric/Behavioral:  Negative for behavioral problems, decreased concentration and sleep disturbance. The patient is not nervous/anxious and is not hyperactive.        Objective   Vitals:  /70   Pulse 64   Wt 84.8 kg (187 lb)   BMI 31.12 kg/m²       Physical Exam  Constitutional:       General: She is not in acute distress.     Appearance: Normal appearance. She is well-developed. She is not ill-appearing or diaphoretic.   HENT:      Head: Normocephalic.      Right Ear: Tympanic membrane, ear canal and external ear normal.      Left Ear: Tympanic membrane, ear canal and external ear normal.      Nose: Nose normal. No congestion.      Mouth/Throat:      Mouth: Mucous membranes are moist.      Pharynx: Oropharynx is clear. No oropharyngeal exudate or posterior oropharyngeal erythema.   Eyes:      General: Lids are normal. No scleral icterus.     Extraocular Movements: Extraocular movements intact.       Conjunctiva/sclera: Conjunctivae normal.      Pupils: Pupils are equal, round, and reactive to light.   Neck:      Vascular: Carotid bruit (left bruit) present.   Cardiovascular:      Rate and Rhythm: Normal rate and regular rhythm.      Pulses: Normal pulses.      Heart sounds: Murmur (2/6 SM RUSB, apex, LLSB) heard.      No gallop.   Pulmonary:      Effort: Pulmonary effort is normal. No respiratory distress.      Breath sounds: No wheezing, rhonchi or rales.   Chest:   Breasts:     Right: No mass.      Left: No mass.      Comments: Dense tissue more prominent on the right lateral  Abdominal:      General: Bowel sounds are normal. There is no distension.      Palpations: Abdomen is soft. There is no mass.      Tenderness: There is no abdominal tenderness. There is no guarding or rebound.   Genitourinary:     Comments: Deferred to gynecology  Musculoskeletal:         General: No swelling or signs of injury.      Cervical back: Normal range of motion and neck supple. No tenderness.      Right lower leg: No edema.      Left lower leg: No edema.   Lymphadenopathy:      Cervical: No cervical adenopathy.      Upper Body:      Right upper body: No supraclavicular or axillary adenopathy.      Left upper body: No supraclavicular or axillary adenopathy.   Skin:     General: Skin is warm and dry.      Coloration: Skin is not pale.      Findings: No bruising or rash.   Neurological:      General: No focal deficit present.      Mental Status: She is alert and oriented to person, place, and time.      Cranial Nerves: No cranial nerve deficit.      Motor: No weakness.      Gait: Gait normal.      Deep Tendon Reflexes: Reflexes normal.   Psychiatric:         Mood and Affect: Mood normal.         Behavior: Behavior normal.         Judgment: Judgment normal.         Assessment/Plan   Full age-appropriate comprehensive physical exam and health care maintenance performed and discussed today.  Routine safety and preventative measures  discussed including self breast exam, seatbelt use, no drinking and driving, no texting and driving, abstinence or cessation of tobacco use, routine dental and vision exams, healthy diet and regular exercise.    We will update comprehensive labs and follow-up on results once available.  Due for mammogram and baseline DEXA.  Both requisitions placed.  Colonoscopy up-to-date from 2022 recommended to be repeated in 5 years total.  EKG up-to-date.  Monitor at appropriate intervals or based on symptoms.  Followed by cardiology.  Has received Prevnar 20.  Tetanus up-to-date from 2021.  Has received original shingles vaccine, unclear whether has received new recombinant series, have counseled as well as for COVID boosters and RSV vaccine.    In addition to the above-mentioned healthcare maintenance and screening studies, the following were discussed and assessed in detail today:  1.  Persistent left ear pain: Exam looks totally normal today, has completed course of antibiotics and eardrops.  She is frustrated with persistence, we will refer to ENT.  2.  Headache syndrome: Unusual presentation, unlikely to be related to continued ear pain, may be related to blood pressure elevation, though unilateral.  Add sed rate, if elevated, would need to evaluate for vascular cause.  3.  Hypertension: Slight improvement with higher dose of losartan, just for few days since dose adjustment.  Continues to monitor and plans to follow-up with cardiology in the next few weeks.  Update BMP.  4.  Atherosclerotic plaque: Optimizing statin therapy.  Update labs.  Followed by cardiology specialist.  5.  Borderline vitamin D deficiency with postmenopausal estrogen deficiency: Update vitamin D level as well as baseline DEXA.    Happy to see her back in 6 months, also followed closely by cardiology.  If she has any questions, she is more than welcome to contact us.  Problem List Items Addressed This Visit    None  Visit Diagnoses       Routine  general medical examination at health care facility    -  Primary

## 2024-03-15 ENCOUNTER — CLINICAL SUPPORT (OUTPATIENT)
Dept: AUDIOLOGY | Facility: CLINIC | Age: 69
End: 2024-03-15
Payer: MEDICARE

## 2024-03-15 DIAGNOSIS — H92.02 LEFT EAR PAIN: ICD-10-CM

## 2024-03-15 DIAGNOSIS — H90.3 SENSORINEURAL HEARING LOSS (SNHL) OF BOTH EARS: Primary | ICD-10-CM

## 2024-03-15 PROCEDURE — 92550 TYMPANOMETRY & REFLEX THRESH: CPT | Performed by: AUDIOLOGIST

## 2024-03-15 PROCEDURE — 92557 COMPREHENSIVE HEARING TEST: CPT | Performed by: AUDIOLOGIST

## 2024-03-15 NOTE — PROGRESS NOTES
"  ADULT AUDIOMETRIC EVALUATION    Name:  Britt Guillaume  :  1955  Age:  68 y.o.  Date of Evaluation:  March 15, 2024    HISTORY:  Reason for visit: Ms. Guillaume is seen today for an evaluation of hearing. Patient was unaccompanied.    Patient reports issues with the left ear over the past two months. She has been treated with antibiotics with little relief. She reports having a burning sensation in the ear this morning that did go away. She had taken a tylenol. She also sleeps with an ice pack on the left side for symptom relief.    Denies: history of otologic surgery, otorrhea, tinnitus, and dizziness/vertigo    EVALUATION:    See Audiogram and Immittance results under \"Media\".    RESULTS:     Otoscopic Evaluation:     RIGHT  Scant amount of non-occluding cerumen    LEFT  Scant amount of non-occluding cerumen    Immittance:   Immittance Measures: 226 Hz          Right Ear: Type Ad: Middle ear pressure within normal limits and enhanced eardrum mobility         Left Ear:  Type A: Normal middle ear function    Reflexes and Reflex Decay:    Ipsilateral Reflexes (500-4000 Hz):          Probe/Stimulus Right Ear: present       Probe/Stimulus Left Ear: present    Otoacoustic Emissions [DP(OAEs)]: Did not test         Audiometry:  Test Technique: Standard Audiometry under insert phones.    Reliability: Good     Pure Tone Audiometry:    Right: Hearing levels within normal limits 125-2000 Hz falling to a mild to moderate sensorineural hearing loss at 0969-4285 Hz   Left: Hearing levels within normal limits 125-2000 Hz falling to mild sensorineural hearing loss 9532-9080 Hz.   NOTE: asymmetry at 8000 Hz, right ear worse.     Speech Audiometry (via recorded, 25-words unless noted; M=masked):       Right Ear: Speech Reception Threshold (SRT) was obtained at 15 dBHL  Word Recognition Scores were Excellent (96%) in quiet when words were presented at 55 dBHL, using the NU-6 2A word list.  Left Ear: Speech Reception " Threshold (SRT) was obtained at 15 dBHL  Word Recognition Scores were Excellent (100%) in quiet when words were presented at 55 dBHL, using the NU-6 3A word list.    IMPRESSIONS:  Today's test results suggest normal middle ear function with hyper-compliance noted in the right ear.     Right: Hearing levels within normal limits 125-2000 Hz falling to a mild to moderate sensorineural hearing loss at 8384-4215 Hz with excellent word understanding.   Left: Hearing levels within normal limits 125-2000 Hz falling to mild sensorineural hearing loss 1874-8329 Hz with excellent word understanding.  NOTE: asymmetry at 8000 Hz, right ear worse.    Compared to previous testing dated 10/19/22, results are stable aside from progression at 8000 Hz bilaterally.    PATIENT EDUCATION:   Britt Guillaume was counseled with regard to the findings.       PLAN:  Follow up with Susan Castanon CNP as directed.  Retest hearing annually; sooner if concerns or changes arise.        Trae Hawkins, CCC-A  Clinical Audiologist    Time: 3378-4826    This note was created using speech recognition transcription software. Despite proofreading, several typographical errors might be present that might affect the meaning of the content. Please call with any questions.     Degree of   Hearing Sensitivity dB Range   Within Normal Limits (WNL) 0 - 20   Slight 25   Mild 26 - 40   Moderate 41 - 55   Moderately-Severe 56 - 70   Severe 71 - 90   Profound 91 +     KEY  TM Tympanic Membrane   WNL Within Normal Limits   HA Hearing Aid   SNHL Sensorineural Hearing Loss   CHL Conductive Hearing Loss   NIHL Noise-Induced Hearing Loss   ECV Ear Canal Volume

## 2024-03-17 LAB
ATRIAL RATE: 58 BPM
P AXIS: 70 DEGREES
P OFFSET: 180 MS
P ONSET: 124 MS
PR INTERVAL: 192 MS
Q ONSET: 220 MS
QRS COUNT: 10 BEATS
QRS DURATION: 92 MS
QT INTERVAL: 432 MS
QTC CALCULATION(BAZETT): 424 MS
QTC FREDERICIA: 426 MS
R AXIS: 38 DEGREES
T AXIS: 65 DEGREES
T OFFSET: 436 MS
VENTRICULAR RATE: 58 BPM

## 2024-03-20 ENCOUNTER — OFFICE VISIT (OUTPATIENT)
Dept: OTOLARYNGOLOGY | Facility: HOSPITAL | Age: 69
End: 2024-03-20
Payer: MEDICARE

## 2024-03-20 VITALS — TEMPERATURE: 97 F | HEIGHT: 65 IN | WEIGHT: 185 LBS | BODY MASS INDEX: 30.82 KG/M2

## 2024-03-20 DIAGNOSIS — H92.02 LEFT EAR PAIN: Primary | ICD-10-CM

## 2024-03-20 PROCEDURE — 1160F RVW MEDS BY RX/DR IN RCRD: CPT | Performed by: NURSE PRACTITIONER

## 2024-03-20 PROCEDURE — 3008F BODY MASS INDEX DOCD: CPT | Performed by: NURSE PRACTITIONER

## 2024-03-20 PROCEDURE — 99204 OFFICE O/P NEW MOD 45 MIN: CPT | Performed by: NURSE PRACTITIONER

## 2024-03-20 PROCEDURE — 99214 OFFICE O/P EST MOD 30 MIN: CPT | Mod: ZK | Performed by: NURSE PRACTITIONER

## 2024-03-20 PROCEDURE — 1036F TOBACCO NON-USER: CPT | Performed by: NURSE PRACTITIONER

## 2024-03-20 PROCEDURE — 1159F MED LIST DOCD IN RCRD: CPT | Performed by: NURSE PRACTITIONER

## 2024-03-20 RX ORDER — CETIRIZINE HYDROCHLORIDE 10 MG/1
10 TABLET ORAL DAILY PRN
Qty: 30 TABLET | Refills: 11 | Status: SHIPPED | OUTPATIENT
Start: 2024-03-20 | End: 2025-03-20

## 2024-03-20 ASSESSMENT — PATIENT HEALTH QUESTIONNAIRE - PHQ9
SUM OF ALL RESPONSES TO PHQ9 QUESTIONS 1 & 2: 0
1. LITTLE INTEREST OR PLEASURE IN DOING THINGS: NOT AT ALL
2. FEELING DOWN, DEPRESSED OR HOPELESS: NOT AT ALL

## 2024-03-20 ASSESSMENT — ENCOUNTER SYMPTOMS
DEPRESSION: 0
LOSS OF SENSATION IN FEET: 0
OCCASIONAL FEELINGS OF UNSTEADINESS: 0

## 2024-03-20 NOTE — PROGRESS NOTES
3/20/24  Britt Guillaume is a 68 y.o. year old female patient with   referred for Referral to ENT.       : 1955    HPI: Britt Guillaume is a 68-year-old female who presents with a 2-month history of left ear pain/pressure.  This feeling of throbbing/pressure/pain is not constant.  She has tried using ice, which has had some minimal improvement.  Ms. Guillaume did also use ibuprofen for a while, but states it was affecting her blood pressure so she stopped that after couple weeks.  She initially went to her dentist because she thought the pain was related to a TMJ problem.  After examination by the dentist it was ruled that the TMJ was not the problem, and she returned to her PCP office for her routine physical exam.  At that time she expressed her concerns about her ears and was referred to ENT for further evaluation.  Of note patient says she does have seasonal allergies, and for these allergies her PCP has prescribed Flonase.  Patient states she picked up her Flonase and recently started with 1 spray each nostril daily.  At times she will take an oral antihistamine, but this is not done on a regular schedule.    ROS:  Unless mentioned in the HPI, all other systems have been reviewed and ar negative.    Constitutional:   General appearance: Healthy appearing, well-nourished, well groomed.  No acute distress.  Communication: Normal communication without aids or , normal voice quality.  No hoarseness, stridor or stertor.    Psychiatric: Oriented to person, place and time.  Normal mood and affect.    Neurologic: Cranial nerves II-XII grossly intact and symmetric bilaterally.    Head and Face:  Head: Atraumatic with no masses, lesions or scarring.  Face: Normal symmetry, no paralysis, synkinesis or facial tic.  No scars or deformities.  Sinuses: Palpation of the face revealed mild right maxillary sinus tenderness  Salivary glands: No tenderness of the parotid gland, no parotid masses.  No tenderness of  the submandibular glands, no submandibular gland masses.  TMJ: Normal, no clicking or pain with palpation.    Eyes: EOMI, PERRL, conjunctiva not edematous or erythematous    Ears: External inspection of ears with no deformity, scars or masses.  Otoscopic examination: Auditory canals with normal appearance, no cerumen obstruction, erythema or edema.  Tympanic membranes intact without middle ear effusion.  Assessment of hearing with normal clinical speech reception to voice.      Nose: External inspection of nose: No nasal lesions, lacerations or scars.  Septum midline.  No inferior turbinate hypertrophy.  Patent with good air entry bilaterally.    Oral Cavity/Mouth: No masses, lesions or ulcers along the lips, gingival or buccal mucosa.  Floor of the mouth is soft without edema or masses.  Teeth in fair condition.  No masses, lesions or ulcers along the tongue.  Oral cavity and oropharynx mucosa moist.  Hard and soft palate intact and symmetric with no masses or lesions.  Posterior oropharynx patent.  Palate, posterior pharyngeal walls and tonsils normal, symmetric with no masses, lesions or ulcers.      Neck: Normal appearing, symmetric, trachea midline.  No crepitus.  Thyroid: Symmetrical, no enlargement, no tenderness, no nodules.    Lymphatic: No palpable cervical lymphadenopathy, no submandibular lymphadenopathy, no supraclavicular lymphadenopathy.    Cardiovascular: Examination of peripheral vascular system shows no clubbing or cyanosis.    Respiratory: No respiratory distress increased work of breathing.  Inspection of the chest with symmetric chest expansion and normal respiratory effort.    Skin: No rashes in the head or neck        Assessment:   68-year-old female with a 2-month history of pressure/throbbing in both ears with the left greater than the right.  Patient after examination has been noted to have findings consistent with bilateral eustachian tube dysfunction secondary to her seasonal  allergies.      Recommendations:  -Start Fluticasone: 1 spray in each nostril daily for 7 days, then 2 sprays in each nostril daily until follow up  -Cetirizine Tab: 1 tab at night before bed daily  -Ocean nasal saline spray: 2 sprays each nostril 4-6 times per day and as needed always wait 1 hour after Fluticasone before uses  -Ayr gel spray: 1 spray at bedtime daily, always wait 1 hour after Fluticasone before uses  -Humidifier at bedtime  -follow up in 2 months  -Business card given and instructed to call with questions    Valentina Castanon, APRN-CNP

## 2024-03-20 NOTE — PATIENT INSTRUCTIONS
Meghna Orr to the clinic of Valentina Castanon CNP. We are here to assist you through your ENT care at [facility].    My office number is [phone]. This number is the most direct way to communicate with the office. Belle is my  and she answers the office phone from 8am-4pm Mon-Fri. She can help you with many general questions and information. Questions that she cannot answer will be directed appropriately to me. You may need to leave a message. In this case, someone from the team will call you back.    Sometimes other team members will also be involved in your care. This may include dieticians, social workers, speech therapists, medical oncologist or radiation oncologists. I will provide these referrals as needed. Please let me know if you would like to request a specific referral.    I look forward to working with you to meet your healthcare goals.     -Start Fluticasone: 1 spray in each nostril daily for 7 days, then 2 sprays in each nostril daily until follow up  -Cetirizine Tab: 1 tab at night before bed daily  -Ocean nasal saline spray: 2 sprays each nostril 4-6 times per day and as needed always wait 1 hour after Fluticasone before uses  -Ayr gel spray: 1 spray at bedtime daily, always wait 1 hour after Fluticasone before uses  -Humidifier at bedtime  -follow up in 2 months  -Business card given and instructed to call with questions

## 2024-03-22 ENCOUNTER — HOSPITAL ENCOUNTER (OUTPATIENT)
Dept: RADIOLOGY | Facility: HOSPITAL | Age: 69
Discharge: HOME | End: 2024-03-22
Payer: MEDICARE

## 2024-03-22 ENCOUNTER — TELEPHONE (OUTPATIENT)
Dept: OTOLARYNGOLOGY | Facility: HOSPITAL | Age: 69
End: 2024-03-22
Payer: MEDICARE

## 2024-03-22 DIAGNOSIS — Z78.0 POSTMENOPAUSAL ESTROGEN DEFICIENCY: ICD-10-CM

## 2024-03-22 PROCEDURE — 77080 DXA BONE DENSITY AXIAL: CPT | Performed by: RADIOLOGY

## 2024-03-22 PROCEDURE — 77080 DXA BONE DENSITY AXIAL: CPT

## 2024-03-22 NOTE — TELEPHONE ENCOUNTER
Can you please call in the generic Flonase for this patient.  Aware you are out today.  Can you call on Monday.  Please advise patient.

## 2024-03-25 RX ORDER — FLUTICASONE PROPIONATE 50 MCG
2 SPRAY, SUSPENSION (ML) NASAL DAILY
Qty: 16 G | Refills: 11 | Status: SHIPPED | OUTPATIENT
Start: 2024-03-25 | End: 2025-03-25

## 2024-04-03 ENCOUNTER — HOSPITAL ENCOUNTER (OUTPATIENT)
Dept: RADIOLOGY | Facility: HOSPITAL | Age: 69
Discharge: HOME | End: 2024-04-03
Payer: MEDICARE

## 2024-04-03 VITALS — WEIGHT: 183 LBS | HEIGHT: 65 IN | BODY MASS INDEX: 30.49 KG/M2

## 2024-04-03 DIAGNOSIS — Z12.31 ENCOUNTER FOR SCREENING MAMMOGRAM FOR MALIGNANT NEOPLASM OF BREAST: ICD-10-CM

## 2024-04-03 PROCEDURE — 77067 SCR MAMMO BI INCL CAD: CPT

## 2024-04-03 PROCEDURE — 77063 BREAST TOMOSYNTHESIS BI: CPT | Performed by: STUDENT IN AN ORGANIZED HEALTH CARE EDUCATION/TRAINING PROGRAM

## 2024-04-03 PROCEDURE — 77067 SCR MAMMO BI INCL CAD: CPT | Performed by: STUDENT IN AN ORGANIZED HEALTH CARE EDUCATION/TRAINING PROGRAM

## 2024-04-08 ENCOUNTER — TELEPHONE (OUTPATIENT)
Dept: SCHEDULING | Age: 69
End: 2024-04-08
Payer: MEDICARE

## 2024-05-01 NOTE — TELEPHONE ENCOUNTER
BP does appear to be improved on the higher dose of losartan. Called patient to see if after being on the higher dose her BP has settled out and is now controlled. ( BP readings she left for review up til earlier in April showed an improvement over time.

## 2024-05-22 ENCOUNTER — APPOINTMENT (OUTPATIENT)
Dept: OTOLARYNGOLOGY | Facility: HOSPITAL | Age: 69
End: 2024-05-22
Payer: MEDICARE

## 2024-06-06 ENCOUNTER — APPOINTMENT (OUTPATIENT)
Dept: OTOLARYNGOLOGY | Facility: CLINIC | Age: 69
End: 2024-06-06
Payer: MEDICARE

## 2024-06-24 ENCOUNTER — OFFICE VISIT (OUTPATIENT)
Dept: OTOLARYNGOLOGY | Facility: HOSPITAL | Age: 69
End: 2024-06-24
Payer: MEDICARE

## 2024-06-24 VITALS — BODY MASS INDEX: 30.19 KG/M2 | WEIGHT: 181.2 LBS | HEIGHT: 65 IN

## 2024-06-24 DIAGNOSIS — H92.02 LEFT EAR PAIN: Primary | ICD-10-CM

## 2024-06-24 DIAGNOSIS — J30.2 SEASONAL ALLERGIC RHINITIS, UNSPECIFIED TRIGGER: ICD-10-CM

## 2024-06-24 PROCEDURE — 1159F MED LIST DOCD IN RCRD: CPT | Performed by: NURSE PRACTITIONER

## 2024-06-24 PROCEDURE — 99213 OFFICE O/P EST LOW 20 MIN: CPT | Performed by: NURSE PRACTITIONER

## 2024-06-24 PROCEDURE — 1036F TOBACCO NON-USER: CPT | Performed by: NURSE PRACTITIONER

## 2024-06-24 PROCEDURE — 1160F RVW MEDS BY RX/DR IN RCRD: CPT | Performed by: NURSE PRACTITIONER

## 2024-06-24 PROCEDURE — 3008F BODY MASS INDEX DOCD: CPT | Performed by: NURSE PRACTITIONER

## 2024-06-24 RX ORDER — MONTELUKAST SODIUM 5 MG/1
5 TABLET, CHEWABLE ORAL DAILY
Qty: 30 TABLET | Refills: 0 | Status: SHIPPED | OUTPATIENT
Start: 2024-06-24 | End: 2024-07-24

## 2024-06-24 ASSESSMENT — ENCOUNTER SYMPTOMS
DEPRESSION: 0
LOSS OF SENSATION IN FEET: 0
OCCASIONAL FEELINGS OF UNSTEADINESS: 0

## 2024-06-24 NOTE — PROGRESS NOTES
.24  Britt Guillaume is a 68 y.o. year old female patient with   referred for  .       : 1955    HPI: Britt Guillaume is a 68-year-old female who presents with occasional left ear pain/pressure.  Since her last appointment, she has had a period of no pain/pressure. Most recently has has had occasional short episodes of pain/pressure. She does express that her allergies have been acting up lately as seen by watery eyes and sinus drainage.    ROS:  Unless mentioned in the HPI, all other systems have been reviewed and ar negative.    Constitutional:   General appearance: Healthy appearing, well-nourished, well groomed.  No acute distress.  Communication: Normal communication without aids or , normal voice quality.  No hoarseness, stridor or stertor.    Psychiatric: Oriented to person, place and time.  Normal mood and affect.    Neurologic: Cranial nerves II-XII grossly intact and symmetric bilaterally.    Head and Face:  Head: Atraumatic with no masses, lesions or scarring.  Face: Normal symmetry, no paralysis, synkinesis or facial tic.  No scars or deformities.  Sinuses: Palpation of the face revealed mild right maxillary sinus tenderness  Salivary glands: No tenderness of the parotid gland, no parotid masses.  No tenderness of the submandibular glands, no submandibular gland masses.  TMJ: Normal, no clicking or pain with palpation.    Eyes: EOMI, PERRL, conjunctiva not edematous or erythematous    Ears: External inspection of ears with no deformity, scars or masses.  Otoscopic examination: Auditory canals with normal appearance, no cerumen obstruction, erythema or edema.  Tympanic membranes intact without middle ear effusion.  Assessment of hearing with normal clinical speech reception to voice.      Nose: External inspection of nose: No nasal lesions, lacerations or scars.  Septum midline.  No inferior turbinate hypertrophy.  Patent with good air entry bilaterally.    Oral Cavity/Mouth: No  masses, lesions or ulcers along the lips, gingival or buccal mucosa.  Floor of the mouth is soft without edema or masses.  Teeth in fair condition.  No masses, lesions or ulcers along the tongue.  Oral cavity and oropharynx mucosa moist.  Hard and soft palate intact and symmetric with no masses or lesions.  Posterior oropharynx patent.  Palate, posterior pharyngeal walls and tonsils normal, symmetric with no masses, lesions or ulcers.      Neck: Normal appearing, symmetric, trachea midline.  No crepitus.  Thyroid: Symmetrical, no enlargement, no tenderness, no nodules.    Lymphatic: No palpable cervical lymphadenopathy, no submandibular lymphadenopathy, no supraclavicular lymphadenopathy.    Cardiovascular: Examination of peripheral vascular system shows no clubbing or cyanosis.    Respiratory: No respiratory distress increased work of breathing.  Inspection of the chest with symmetric chest expansion and normal respiratory effort.    Skin: No rashes in the head or neck        Assessment:   68-year-old female noted to have findings consistent with bilateral eustachian tube dysfunction secondary to her seasonal allergies.      Recommendations:  - Start Singular: 1 tab daily  -Continue Fluticasone: 1 spray in each nostril daily for 7 days, then 2 sprays in each nostril daily until follow up  -Continue Cetirizine Tab: 1 tab at night before bed daily  -Ocean nasal saline spray: 2 sprays each nostril 4-6 times per day and as needed always wait 1 hour after Fluticasone before uses  -Ayr gel spray: 1 spray at bedtime daily, always wait 1 hour after Fluticasone before uses  -Humidifier at bedtime  -follow up in 4 months  -Instructed to call with questions     SUSANNAH Conn-CNP

## 2024-06-24 NOTE — PATIENT INSTRUCTIONS
Meghna Orr to the clinic of Valentina Castanon CNP. We are here to assist you through your ENT care at [facility].    My office number is [phone]. This number is the most direct way to communicate with the office. Belle is my  and she answers the office phone from 8am-4pm Mon-Fri. She can help you with many general questions and information. Questions that she cannot answer will be directed appropriately to me. You may need to leave a message. In this case, someone from the team will call you back.    Sometimes other team members will also be involved in your care. This may include dieticians, social workers, speech therapists, medical oncologist or radiation oncologists. I will provide these referrals as needed. Please let me know if you would like to request a specific referral.    I look forward to working with you to meet your healthcare goals.     - Start Singular: 1 tab daily  -Continue Fluticasone: 1 spray in each nostril daily for 7 days, then 2 sprays in each nostril daily until follow up  -Continue Cetirizine Tab: 1 tab at night before bed daily  -Ocean nasal saline spray: 2 sprays each nostril 4-6 times per day and as needed always wait 1 hour after Fluticasone before uses  -Ayr gel spray: 1 spray at bedtime daily, always wait 1 hour after Fluticasone before uses  -Humidifier at bedtime  -follow up in 4 months  -Instructed to call with questions

## 2024-07-31 ENCOUNTER — APPOINTMENT (OUTPATIENT)
Dept: OTOLARYNGOLOGY | Facility: HOSPITAL | Age: 69
End: 2024-07-31
Payer: MEDICARE

## 2024-08-13 ENCOUNTER — TELEPHONE (OUTPATIENT)
Dept: OTOLARYNGOLOGY | Facility: HOSPITAL | Age: 69
End: 2024-08-13
Payer: MEDICARE

## 2024-08-13 DIAGNOSIS — J30.2 SEASONAL ALLERGIC RHINITIS, UNSPECIFIED TRIGGER: ICD-10-CM

## 2024-08-13 DIAGNOSIS — H92.02 LEFT EAR PAIN: ICD-10-CM

## 2024-08-13 RX ORDER — MONTELUKAST SODIUM 5 MG/1
5 TABLET, CHEWABLE ORAL DAILY
Qty: 30 TABLET | Refills: 11 | Status: SHIPPED | OUTPATIENT
Start: 2024-08-13 | End: 2024-09-12

## 2024-08-13 NOTE — TELEPHONE ENCOUNTER
Patient states the medication for her ear was working but she is now out of it.  Can she get a refill please.  Please advise.        I call and spoke to the patient. I have sent refills of Singulair to the [facility] in [place].

## 2024-09-17 DIAGNOSIS — I10 HYPERTENSION, UNSPECIFIED TYPE: ICD-10-CM

## 2024-09-17 RX ORDER — HYDROCHLOROTHIAZIDE 25 MG/1
25 TABLET ORAL DAILY
Qty: 30 TABLET | Refills: 0 | Status: SHIPPED | OUTPATIENT
Start: 2024-09-17 | End: 2024-10-17

## 2024-10-02 ENCOUNTER — OFFICE VISIT (OUTPATIENT)
Dept: OTOLARYNGOLOGY | Facility: HOSPITAL | Age: 69
End: 2024-10-02
Payer: MEDICARE

## 2024-10-02 VITALS — WEIGHT: 182 LBS | TEMPERATURE: 98 F | HEIGHT: 65 IN | BODY MASS INDEX: 30.32 KG/M2

## 2024-10-02 DIAGNOSIS — H92.02 LEFT EAR PAIN: ICD-10-CM

## 2024-10-02 PROCEDURE — 99213 OFFICE O/P EST LOW 20 MIN: CPT | Performed by: NURSE PRACTITIONER

## 2024-10-02 PROCEDURE — 1160F RVW MEDS BY RX/DR IN RCRD: CPT | Performed by: NURSE PRACTITIONER

## 2024-10-02 PROCEDURE — 1159F MED LIST DOCD IN RCRD: CPT | Performed by: NURSE PRACTITIONER

## 2024-10-02 PROCEDURE — 3008F BODY MASS INDEX DOCD: CPT | Performed by: NURSE PRACTITIONER

## 2024-10-02 PROCEDURE — 1036F TOBACCO NON-USER: CPT | Performed by: NURSE PRACTITIONER

## 2024-10-02 NOTE — PROGRESS NOTES
.10/2/24  Britt Guillaume is a 69 y.o. year old female patient with   referred for Follow Up In Ent.       : 1955    HPI: Britt Guillaume is a 8-year-old female who presents with occasional left ear pain/pressure.  Since her last appointment, she has had a period of no headaches with occasional pressure in her ears. She states she has been taking her medications as prescribed.      ROS:  Unless mentioned in the HPI, all other systems have been reviewed and ar negative.    Constitutional:   General appearance: Healthy appearing, well-nourished, well groomed.  No acute distress.  Communication: Normal communication without aids or , normal voice quality.  No hoarseness, stridor or stertor.    Psychiatric: Oriented to person, place and time.  Normal mood and affect.    Neurologic: Cranial nerves II-XII grossly intact and symmetric bilaterally.    Head and Face:  Head: Atraumatic with no masses, lesions or scarring.  Face: Normal symmetry, no paralysis, synkinesis or facial tic.  No scars or deformities.  Sinuses: Palpation of the face revealed mild right maxillary sinus tenderness  Salivary glands: No tenderness of the parotid gland, no parotid masses.  No tenderness of the submandibular glands, no submandibular gland masses.  TMJ: Normal, no clicking or pain with palpation.    Eyes: EOMI, PERRL, conjunctiva not edematous or erythematous    Ears: External inspection of ears with no deformity, scars or masses.  Otoscopic examination: Auditory canals with normal appearance, no cerumen obstruction, erythema or edema.  Tympanic membranes intact with Right middle ear effusion.  Assessment of hearing with normal clinical speech reception to voice.      Nose: External inspection of nose: No nasal lesions, lacerations or scars.  Septum midline.  No inferior turbinate hypertrophy.  Patent with good air entry bilaterally.    Oral Cavity/Mouth: No masses, lesions or ulcers along the lips, gingival or buccal  mucosa.  Floor of the mouth is soft without edema or masses.  Teeth in fair condition.  No masses, lesions or ulcers along the tongue.  Oral cavity and oropharynx mucosa moist.  Hard and soft palate intact and symmetric with no masses or lesions.  Posterior oropharynx patent.  Palate, posterior pharyngeal walls and tonsils normal, symmetric with no masses, lesions or ulcers.      Neck: Normal appearing, symmetric, trachea midline.  No crepitus.  Thyroid: Symmetrical, no enlargement, no tenderness, no nodules.    Lymphatic: No palpable cervical lymphadenopathy, no submandibular lymphadenopathy, no supraclavicular lymphadenopathy.    Cardiovascular: Examination of peripheral vascular system shows no clubbing or cyanosis.    Respiratory: No respiratory distress increased work of breathing.  Inspection of the chest with symmetric chest expansion and normal respiratory effort.    Skin: No rashes in the head or neck        Assessment:   69-year-old female noted to have findings consistent with bilateral eustachian tube dysfunction secondary to her seasonal allergies.      Recommendations:  - Continue Singular: 1 tab daily  -Continue Fluticasone: 1 spray in each nostril daily for 7 days, then 2 sprays in each nostril daily until follow up  -Continue Cetirizine Tab: 1 tab at night before bed daily  -Ocean nasal saline spray: 2 sprays each nostril 4-6 times per day and as needed always wait 1 hour after Fluticasone before uses  -Ayr gel spray: 1 spray at bedtime daily, always wait 1 hour after Fluticasone before uses  -Humidifier at bedtime  -follow up in February/March 2025  -Instructed to call with questions     SUSANNAH Conn-CNP

## 2024-10-02 NOTE — PATIENT INSTRUCTIONS
Meghna Orr to the clinic of Valentina Castanon CNP. We are here to assist you through your ENT care at [facility].    My office number is [phone]. This number is the most direct way to communicate with the office. Belle is my  and she answers the office phone from 8am-4pm Mon-Fri. She can help you with many general questions and information. Questions that she cannot answer will be directed appropriately to me. You may need to leave a message. In this case, someone from the team will call you back.    Sometimes other team members will also be involved in your care. This may include dieticians, social workers, speech therapists, medical oncologist or radiation oncologists. I will provide these referrals as needed. Please let me know if you would like to request a specific referral.    I look forward to working with you to meet your healthcare goals.     - Continue Singular: 1 tab daily  -Continue Fluticasone: 1 spray in each nostril daily for 7 days, then 2 sprays in each nostril daily until follow up  -Continue Cetirizine Tab: 1 tab at night before bed daily  -Ocean nasal saline spray: 2 sprays each nostril 4-6 times per day and as needed always wait 1 hour after Fluticasone before uses  -Ayr gel spray: 1 spray at bedtime daily, always wait 1 hour after Fluticasone before uses  -Humidifier at bedtime  -follow up in February/March 2025  -Instructed to call with questions

## 2024-10-14 ENCOUNTER — APPOINTMENT (OUTPATIENT)
Dept: OTOLARYNGOLOGY | Facility: HOSPITAL | Age: 69
End: 2024-10-14
Payer: MEDICARE

## 2024-11-14 DIAGNOSIS — I10 HYPERTENSION, UNSPECIFIED TYPE: ICD-10-CM

## 2024-11-14 DIAGNOSIS — R09.89 BRUIT OF LEFT CAROTID ARTERY: ICD-10-CM

## 2024-11-14 RX ORDER — HYDROCHLOROTHIAZIDE 25 MG/1
25 TABLET ORAL DAILY
Qty: 90 TABLET | Refills: 3 | Status: SHIPPED | OUTPATIENT
Start: 2024-11-14 | End: 2025-11-14

## 2024-11-14 RX ORDER — ROSUVASTATIN CALCIUM 10 MG/1
10 TABLET, COATED ORAL DAILY
Qty: 90 TABLET | Refills: 3 | Status: SHIPPED | OUTPATIENT
Start: 2024-11-14 | End: 2025-11-14

## 2025-01-17 ENCOUNTER — APPOINTMENT (OUTPATIENT)
Dept: GASTROENTEROLOGY | Facility: CLINIC | Age: 70
End: 2025-01-17
Payer: MEDICARE

## 2025-01-17 VITALS — HEIGHT: 65 IN | OXYGEN SATURATION: 92 % | WEIGHT: 192 LBS | HEART RATE: 60 BPM | BODY MASS INDEX: 31.99 KG/M2

## 2025-01-17 DIAGNOSIS — K58.1 IRRITABLE BOWEL SYNDROME WITH CONSTIPATION: ICD-10-CM

## 2025-01-17 DIAGNOSIS — K21.9 GASTROESOPHAGEAL REFLUX DISEASE, UNSPECIFIED WHETHER ESOPHAGITIS PRESENT: ICD-10-CM

## 2025-01-17 DIAGNOSIS — R14.3 FLATULENCE: Primary | ICD-10-CM

## 2025-01-17 PROCEDURE — 99213 OFFICE O/P EST LOW 20 MIN: CPT

## 2025-01-17 PROCEDURE — 1159F MED LIST DOCD IN RCRD: CPT

## 2025-01-17 PROCEDURE — 3008F BODY MASS INDEX DOCD: CPT

## 2025-01-17 RX ORDER — PANTOPRAZOLE SODIUM 40 MG/1
40 TABLET, DELAYED RELEASE ORAL DAILY
Qty: 30 TABLET | Refills: 0 | Status: SHIPPED | OUTPATIENT
Start: 2025-01-17 | End: 2026-01-17

## 2025-01-17 RX ORDER — PANTOPRAZOLE SODIUM 40 MG/1
40 TABLET, DELAYED RELEASE ORAL
Qty: 90 TABLET | Refills: 3 | Status: SHIPPED | OUTPATIENT
Start: 2025-01-17

## 2025-01-17 ASSESSMENT — ENCOUNTER SYMPTOMS
RECTAL PAIN: 0
NAUSEA: 0
CHILLS: 0
CONSTIPATION: 1
VOMITING: 0
FATIGUE: 0
APPETITE CHANGE: 0
ABDOMINAL DISTENTION: 1
ABDOMINAL PAIN: 0
ANAL BLEEDING: 0
TROUBLE SWALLOWING: 0
BLOOD IN STOOL: 0
FEVER: 0
SHORTNESS OF BREATH: 0
COUGH: 0
DIARRHEA: 0

## 2025-01-17 NOTE — PROGRESS NOTES
Subjective     History of Present Illness:   Britt Guillaume is a 69 y.o. female with PMHx of constipation, hypertension, IBS  who presents to GI clinic for medication refill last seen 2023 for constipation, MiraLAX daily.  Acid reflux, 40 mg Protonix     Today,  Patient has constant flatulence and bloating.  Has not been completely emptying bowels, but moves them daily. Senna does not work, but senna tea does.  Drinking more water and eating more fiber.  Miralax did not work and causes stomach upset.  Benefiber worked for a while, but stopped.  States 40 mg daily Protonix controls her GERD completely.  She has ran out, tried Prevacid which is not working as effectively.  Denies diarrhea, melena, hematochezia, dysphagia, unintentional weight loss      Last colonoscopy 10/2020 to Dr. Cuba: Nonbleeding internal hemorrhoids. Repeat 5 years  Last endoscopy 2016 Dr. Meyer at [facility]: Small hiatal hernia, gastric polyps     Denies family history of IBD or GI malignancy     Abdominal surgeries: , hysterectomy     Social ETOH, denies smoking, denies drug use    Past Medical History  As per HPI.     Social History  she  reports that she quit smoking about 25 years ago. Her smoking use included cigarettes. She has never used smokeless tobacco. She reports that she does not use drugs.     Family History  her family history includes Heart failure in her brother; Hypertension in her brother, father, mother, sibling, and sister; Prostate cancer in her brother; bladder cancer in her brother.     Review of Systems  Review of Systems   Constitutional:  Negative for appetite change, chills, fatigue and fever.   HENT:  Negative for trouble swallowing.    Respiratory:  Negative for cough and shortness of breath.    Gastrointestinal:  Positive for abdominal distention and constipation. Negative for abdominal pain, anal bleeding, blood in stool, diarrhea, nausea, rectal pain and vomiting.       Allergies  Allergies    Allergen Reactions    Milk Other     Gastrointestinal upset, nausea       Medications  Current Outpatient Medications   Medication Instructions    amLODIPine (NORVASC) 5 mg, oral, Daily    cetirizine (ZYRTEC) 10 mg, oral, Daily PRN    fluticasone (Flonase) 50 mcg/actuation nasal spray 2 sprays, Each Nostril, Daily, Shake gently. Before first use, prime pump. After use, clean tip and replace cap.    hydroCHLOROthiazide (HYDRODIURIL) 25 mg, oral, Daily, Patient needs follow up visit prior to more refills.    losartan (COZAAR) 100 mg, oral, Daily    meloxicam (Mobic) 15 mg tablet 1 tablet, oral, Daily PRN    montelukast (SINGULAIR) 5 mg, oral, Daily    pantoprazole (PROTONIX) 40 mg, oral, Daily before breakfast, (30 min to 1 hour before breakfast).    pantoprazole (PROTONIX) 40 mg, oral, Daily, Do not crush, chew, or split.    rosuvastatin (CRESTOR) 10 mg, oral, Daily        Objective   Visit Vitals  Pulse 60      Physical Exam  Constitutional:       Appearance: Normal appearance. She is normal weight.   HENT:      Mouth/Throat:      Mouth: Mucous membranes are dry.      Pharynx: Oropharynx is clear.   Cardiovascular:      Rate and Rhythm: Normal rate and regular rhythm.   Pulmonary:      Effort: Pulmonary effort is normal.      Breath sounds: Normal breath sounds. No wheezing or rhonchi.   Abdominal:      General: Abdomen is flat. Bowel sounds are normal. There is distension.      Palpations: Abdomen is soft. There is no hepatomegaly.      Tenderness: There is no abdominal tenderness. There is no guarding or rebound. Negative signs include Andrew's sign.      Hernia: No hernia is present.   Musculoskeletal:         General: Normal range of motion.   Skin:     General: Skin is warm and dry.   Neurological:      General: No focal deficit present.      Mental Status: She is alert and oriented to person, place, and time.   Psychiatric:         Mood and Affect: Mood normal.         Behavior: Behavior normal.            Lab Results   Component Value Date    WBC 8.6 03/12/2024    WBC 9.7 03/16/2021    HGB 12.8 03/12/2024    HGB 13.3 03/16/2021    HCT 39.4 03/12/2024    HCT 40.7 03/16/2021     03/12/2024     03/16/2021     Lab Results   Component Value Date     03/12/2024     11/20/2023     03/15/2023    K 3.5 03/12/2024    K 3.5 11/20/2023    K 3.4 (L) 03/15/2023     03/12/2024     11/20/2023     03/15/2023    CO2 32 03/12/2024    CO2 28 11/20/2023    CO2 30 03/15/2023    BUN 17 03/12/2024    BUN 16 11/20/2023    BUN 14 03/15/2023    CREATININE 0.86 03/12/2024    CREATININE 0.85 11/20/2023    CREATININE 0.81 03/15/2023    CALCIUM 10.3 03/12/2024    CALCIUM 10.2 11/20/2023    CALCIUM 10.6 03/15/2023    PROT 7.1 03/12/2024    BILITOT 0.8 03/12/2024    ALKPHOS 96 03/12/2024    ALT 16 03/12/2024    AST 23 03/12/2024    GLUCOSE 94 03/12/2024    GLUCOSE 90 11/20/2023    GLUCOSE 98 03/15/2023           Britt Guillaume is a 69 y.o. female who presents to GI clinic for IBS-C, GERD and flatulence.    Flatulence  Consider SIBO  -Hydrogen breath test ordered  -Diet and supplement recommendations    Irritable bowel  IBS-C uncontrolled  -72 and 145 mcg Linzess samples given  -Patient will update me on MyChart with effectiveness    Acid reflux  Controlled  -Continue 40 mg daily Protonix    Follow-up annually or as needed         Nupur Escobedo, APRN-CNP

## 2025-01-17 NOTE — PATIENT INSTRUCTIONS
Please get your breath test to assess for SIBO    Things you may try: Increase water intake, lukewarm lemon water, mingo and tumeric, encapsulated peppermint, look into a supplement called L-glutamine, jacque, papaya or pineapple after meals, probiotics like kambucha, sauerkraut, sourdough bread, and chew food thoroughly    I will give you low and medium dose Linzess samples.  Let me know how these work for you.    Continue the 40 mg daily protonix

## 2025-01-17 NOTE — ASSESSMENT & PLAN NOTE
IBS-C uncontrolled  -72 and 145 mcg Linzess samples given  -Patient will update me on MyChart with effectiveness

## 2025-02-05 DIAGNOSIS — K58.1 IRRITABLE BOWEL SYNDROME WITH CONSTIPATION: Primary | ICD-10-CM

## 2025-02-18 ENCOUNTER — HOSPITAL ENCOUNTER (OUTPATIENT)
Dept: RADIOLOGY | Facility: HOSPITAL | Age: 70
Discharge: HOME | End: 2025-02-18
Payer: MEDICARE

## 2025-02-18 DIAGNOSIS — Z96.652 PRESENCE OF LEFT ARTIFICIAL KNEE JOINT: ICD-10-CM

## 2025-02-18 DIAGNOSIS — Z47.1 AFTERCARE FOLLOWING JOINT REPLACEMENT SURGERY: ICD-10-CM

## 2025-02-18 PROCEDURE — 73562 X-RAY EXAM OF KNEE 3: CPT | Mod: LEFT SIDE | Performed by: RADIOLOGY

## 2025-02-18 PROCEDURE — 73560 X-RAY EXAM OF KNEE 1 OR 2: CPT | Mod: RT

## 2025-02-18 PROCEDURE — 73562 X-RAY EXAM OF KNEE 3: CPT | Mod: LT

## 2025-02-18 PROCEDURE — 73560 X-RAY EXAM OF KNEE 1 OR 2: CPT | Mod: RIGHT SIDE | Performed by: RADIOLOGY

## 2025-02-19 DIAGNOSIS — K59.09 CHRONIC CONSTIPATION: Primary | ICD-10-CM

## 2025-02-19 RX ORDER — LUBIPROSTONE 24 UG/1
24 CAPSULE ORAL
Qty: 60 CAPSULE | Refills: 11 | Status: SHIPPED | OUTPATIENT
Start: 2025-02-19 | End: 2026-02-19

## 2025-03-10 ENCOUNTER — APPOINTMENT (OUTPATIENT)
Dept: PRIMARY CARE | Facility: CLINIC | Age: 70
End: 2025-03-10
Payer: MEDICARE

## 2025-03-10 VITALS
DIASTOLIC BLOOD PRESSURE: 72 MMHG | HEIGHT: 65 IN | WEIGHT: 191.4 LBS | SYSTOLIC BLOOD PRESSURE: 142 MMHG | HEART RATE: 59 BPM | BODY MASS INDEX: 31.89 KG/M2

## 2025-03-10 DIAGNOSIS — E55.9 VITAMIN D DEFICIENCY: ICD-10-CM

## 2025-03-10 DIAGNOSIS — I10 HYPERTENSION, UNSPECIFIED TYPE: ICD-10-CM

## 2025-03-10 DIAGNOSIS — R73.03 PREDIABETES: ICD-10-CM

## 2025-03-10 DIAGNOSIS — K59.09 CHRONIC CONSTIPATION: ICD-10-CM

## 2025-03-10 DIAGNOSIS — Z12.31 ENCOUNTER FOR SCREENING MAMMOGRAM FOR MALIGNANT NEOPLASM OF BREAST: ICD-10-CM

## 2025-03-10 DIAGNOSIS — K21.9 GASTROESOPHAGEAL REFLUX DISEASE, UNSPECIFIED WHETHER ESOPHAGITIS PRESENT: ICD-10-CM

## 2025-03-10 DIAGNOSIS — Z00.00 ROUTINE GENERAL MEDICAL EXAMINATION AT HEALTH CARE FACILITY: Primary | ICD-10-CM

## 2025-03-10 DIAGNOSIS — I70.90 ATHEROSCLEROTIC PLAQUE: ICD-10-CM

## 2025-03-10 DIAGNOSIS — Z01.419 ENCOUNTER FOR ANNUAL ROUTINE GYNECOLOGICAL EXAMINATION: ICD-10-CM

## 2025-03-10 PROCEDURE — 99214 OFFICE O/P EST MOD 30 MIN: CPT | Performed by: INTERNAL MEDICINE

## 2025-03-10 PROCEDURE — 1160F RVW MEDS BY RX/DR IN RCRD: CPT | Performed by: INTERNAL MEDICINE

## 2025-03-10 PROCEDURE — 1123F ACP DISCUSS/DSCN MKR DOCD: CPT | Performed by: INTERNAL MEDICINE

## 2025-03-10 PROCEDURE — G0101 CA SCREEN;PELVIC/BREAST EXAM: HCPCS | Performed by: INTERNAL MEDICINE

## 2025-03-10 PROCEDURE — 1036F TOBACCO NON-USER: CPT | Performed by: INTERNAL MEDICINE

## 2025-03-10 PROCEDURE — 1159F MED LIST DOCD IN RCRD: CPT | Performed by: INTERNAL MEDICINE

## 2025-03-10 PROCEDURE — 87624 HPV HI-RISK TYP POOLED RSLT: CPT

## 2025-03-10 PROCEDURE — 88175 CYTOPATH C/V AUTO FLUID REDO: CPT

## 2025-03-10 PROCEDURE — 3078F DIAST BP <80 MM HG: CPT | Performed by: INTERNAL MEDICINE

## 2025-03-10 PROCEDURE — G0439 PPPS, SUBSEQ VISIT: HCPCS | Performed by: INTERNAL MEDICINE

## 2025-03-10 PROCEDURE — 3008F BODY MASS INDEX DOCD: CPT | Performed by: INTERNAL MEDICINE

## 2025-03-10 PROCEDURE — 1170F FXNL STATUS ASSESSED: CPT | Performed by: INTERNAL MEDICINE

## 2025-03-10 PROCEDURE — 3077F SYST BP >= 140 MM HG: CPT | Performed by: INTERNAL MEDICINE

## 2025-03-10 ASSESSMENT — ENCOUNTER SYMPTOMS
LOSS OF SENSATION IN FEET: 0
DECREASED CONCENTRATION: 0
VOMITING: 0
ABDOMINAL DISTENTION: 0
DIFFICULTY URINATING: 0
EYE ITCHING: 0
CHEST TIGHTNESS: 0
COUGH: 0
HEMATURIA: 0
ADENOPATHY: 0
NECK STIFFNESS: 0
NAUSEA: 0
OCCASIONAL FEELINGS OF UNSTEADINESS: 0
CHILLS: 0
RHINORRHEA: 0
CONSTIPATION: 1
ARTHRALGIAS: 1
MYALGIAS: 0
NERVOUS/ANXIOUS: 0
VOICE CHANGE: 0
FEVER: 0
SEIZURES: 0
HYPERACTIVE: 0
SLEEP DISTURBANCE: 0
ACTIVITY CHANGE: 0
DIARRHEA: 0
FREQUENCY: 0
ABDOMINAL PAIN: 0
HEADACHES: 0
DIZZINESS: 0
BACK PAIN: 0
LIGHT-HEADEDNESS: 0
COLOR CHANGE: 0
DYSURIA: 0
APPETITE CHANGE: 0
DYSPHORIC MOOD: 0
NECK PAIN: 0
EYE DISCHARGE: 0
SINUS PAIN: 0
WEAKNESS: 0
DEPRESSION: 0
SHORTNESS OF BREATH: 0
PALPITATIONS: 0
FATIGUE: 0
SINUS PRESSURE: 0
WHEEZING: 0
SORE THROAT: 0
BRUISES/BLEEDS EASILY: 0

## 2025-03-10 ASSESSMENT — ACTIVITIES OF DAILY LIVING (ADL)
TAKING_MEDICATION: INDEPENDENT
MANAGING_FINANCES: INDEPENDENT
BATHING: INDEPENDENT
DOING_HOUSEWORK: INDEPENDENT
DRESSING: INDEPENDENT
GROCERY_SHOPPING: INDEPENDENT

## 2025-03-10 NOTE — PATIENT INSTRUCTIONS
It was a pleasure seeing you in the office today.  We will follow up on all comprehensive blood work once results are available and make any recommendations based on these results as may be indicated.  Please continue with routine gynecologic exams and annual mammograms.  Bone density scan and colonoscopy remain up-to-date and I thank you for keeping up with routine vaccines.  You could consider shingles vaccine series.  Please keep close follow-up with your specialists.  If you have any questions, please feel free to contact us.  Otherwise, we are happy to see you back in 6 months for brief follow-up.

## 2025-03-10 NOTE — PROGRESS NOTES
Subjective   Reason for Visit: Britt Guillaume is an 69 y.o. female patient comes in today for comprehensive follow-up of medical conditions in conjunction with annual wellness visit    Ms. Guillaume comes in today for comprehensive follow-up of medical conditions in conjunction with annual wellness visit, dictated in a separate subsection.  Please refer to that portion of the note for details on healthcare maintenance and screening studies.  She will is feeling well overall.  Blood pressure is quite elevated today, this typically runs better, but she has not been monitoring this recently.  She does take her medications regularly and does not feel that she needs refills at this time.  She denies any headaches, dizziness, chest pain or palpitations, shortness of breath nor cough, nausea, vomiting, nor changes in bladder habits.  She is still struggling with chronic constipation, she has been switched to Linzess which seems to work reasonably well, but they are trying to get a newer medication approved for even more benefit.  She keeps up with routine healthcare maintenance studies and vaccines.  She does feel that she is due for an updated Pap as it has been several years.  She is amenable to updating comprehensive lab studies today.        Patient Care Team:  Mayte Guzman MD as PCP - General (Internal Medicine)  Mayte Guzman MD as PCP - O Medicare Advantage PCP     Review of Systems   Constitutional:  Negative for activity change, appetite change, chills, fatigue and fever.   HENT:  Negative for congestion, ear pain, postnasal drip, rhinorrhea, sinus pressure, sinus pain, sneezing, sore throat, tinnitus and voice change.    Eyes:  Negative for discharge, itching and visual disturbance.   Respiratory:  Negative for cough, chest tightness, shortness of breath and wheezing.    Cardiovascular:  Negative for chest pain, palpitations and leg swelling.   Gastrointestinal:  Positive for constipation. Negative for abdominal  "distention, abdominal pain, diarrhea, nausea and vomiting.   Endocrine: Negative for cold intolerance, heat intolerance and polyuria.   Genitourinary:  Negative for difficulty urinating, dysuria, frequency, hematuria, urgency, vaginal bleeding and vaginal discharge.   Musculoskeletal:  Positive for arthralgias. Negative for back pain, myalgias, neck pain and neck stiffness.   Skin:  Negative for color change, pallor and rash.   Allergic/Immunologic: Negative for environmental allergies, food allergies and immunocompromised state.   Neurological:  Negative for dizziness, seizures, syncope, weakness, light-headedness and headaches.   Hematological:  Negative for adenopathy. Does not bruise/bleed easily.   Psychiatric/Behavioral:  Negative for behavioral problems, decreased concentration, dysphoric mood and sleep disturbance. The patient is not nervous/anxious and is not hyperactive.        Objective   Vitals:  /82   Pulse 59   Ht 1.651 m (5' 5\")   Wt 86.8 kg (191 lb 6.4 oz)   LMP  (LMP Unknown)   BMI 31.85 kg/m²       Physical Exam  Constitutional:       General: She is not in acute distress.     Appearance: Normal appearance. She is well-developed. She is not ill-appearing.   HENT:      Head: Normocephalic.      Right Ear: Tympanic membrane, ear canal and external ear normal.      Left Ear: Tympanic membrane, ear canal and external ear normal.      Nose: Nose normal. No congestion.      Mouth/Throat:      Mouth: Mucous membranes are moist.      Pharynx: Oropharynx is clear. No oropharyngeal exudate or posterior oropharyngeal erythema.   Eyes:      General: Lids are normal. No scleral icterus.     Extraocular Movements: Extraocular movements intact.      Conjunctiva/sclera: Conjunctivae normal.      Pupils: Pupils are equal, round, and reactive to light.   Neck:      Vascular: No carotid bruit.      Comments: Mild thyroid fullness, no nodules appreciated  Cardiovascular:      Rate and Rhythm: Normal rate " and regular rhythm.      Pulses: Normal pulses.      Heart sounds: Murmur (2/6 SM RUSB) heard.      No gallop.   Pulmonary:      Effort: Pulmonary effort is normal. No respiratory distress.      Breath sounds: No wheezing, rhonchi or rales.   Chest:   Breasts:     Right: No mass.      Left: No mass.   Abdominal:      General: Bowel sounds are normal. There is no distension.      Palpations: Abdomen is soft. There is no mass.      Tenderness: There is no abdominal tenderness. There is no guarding or rebound.   Genitourinary:     General: Normal vulva.      Vagina: No vaginal discharge.   Musculoskeletal:         General: No swelling or signs of injury.      Cervical back: Normal range of motion and neck supple. No tenderness.      Right lower leg: No edema.      Left lower leg: No edema.   Lymphadenopathy:      Cervical: No cervical adenopathy.      Upper Body:      Right upper body: No supraclavicular or axillary adenopathy.      Left upper body: No supraclavicular or axillary adenopathy.   Skin:     General: Skin is warm and dry.      Coloration: Skin is not pale.      Findings: No bruising or rash.   Neurological:      General: No focal deficit present.      Mental Status: She is alert and oriented to person, place, and time.      Cranial Nerves: No cranial nerve deficit.      Motor: No weakness.      Coordination: Coordination normal.      Gait: Gait normal.   Psychiatric:         Mood and Affect: Mood normal.         Behavior: Behavior normal.         Thought Content: Thought content normal.         Judgment: Judgment normal.         Assessment & Plan    Full age-appropriate comprehensive physical exam and health care maintenance performed and discussed today.  Routine safety and preventative measures discussed including self breast exam, seatbelt use, no drinking and driving, no texting and driving, abstinence or cessation of tobacco use, routine dental and vision exams, healthy diet and regular exercise.    We  will update comprehensive labs and follow-up on results once available.  EKG deferred to cardiology specialist.  Colonoscopy is up-to-date from 2022, recommended repeat 5 years.  DEXA up-to-date from 2024 and quite normal, repeat 5 years.  Mammogram will be due in April.  Order placed.  Have counseled regarding shingles vaccine.  Tetanus up-to-date from 2021.  Has received annual flu shot and updated COVID booster as well as Prevnar 20.    In addition to the above-mentioned healthcare maintenance and screening studies, the following were discussed and assessed in detail today:  1.  Hypertension: Initial presentation elevated, comes down nicely with her visit, she will continue monitoring this at home and follow-up with her cardiologist as well.  Update BMP.  Contact us if refills needed.  2.  Vitamin D deficiency: Last DEXA looked outstanding.  Continue monitoring vitamin D.  3.  Borderline diabetes: Continue monitoring A1c.  4.  Carotid stenosis with plaque: On statin therapy, continue blood pressure lowering therapy.  Continue following with specialist regularly.  5.  GERD: Continues on PPI therapy.  Continues to follow with GI regularly.  6.  Chronic constipation: Currently on Linzess with some benefit, but knew her age and trying to get approved, follows by GI.    Happy to see her back in 6 months for brief blood pressure check.  She is to contact us with any questions in the interim.

## 2025-03-14 LAB
25(OH)D3+25(OH)D2 SERPL-MCNC: 42 NG/ML (ref 30–100)
ALBUMIN SERPL-MCNC: 4.4 G/DL (ref 3.6–5.1)
ALBUMIN/CREAT UR: NORMAL MG/G CREAT
ALP SERPL-CCNC: 80 U/L (ref 37–153)
ALT SERPL-CCNC: 12 U/L (ref 6–29)
ANION GAP SERPL CALCULATED.4IONS-SCNC: 7 MMOL/L (CALC) (ref 7–17)
AST SERPL-CCNC: 19 U/L (ref 10–35)
BASOPHILS # BLD AUTO: 56 CELLS/UL (ref 0–200)
BASOPHILS NFR BLD AUTO: 0.6 %
BILIRUB SERPL-MCNC: 0.6 MG/DL (ref 0.2–1.2)
BUN SERPL-MCNC: 15 MG/DL (ref 7–25)
CALCIUM SERPL-MCNC: 10.4 MG/DL (ref 8.6–10.4)
CHLORIDE SERPL-SCNC: 102 MMOL/L (ref 98–110)
CHOLEST SERPL-MCNC: 126 MG/DL
CHOLEST/HDLC SERPL: 2.5 (CALC)
CK SERPL-CCNC: 159 U/L (ref 20–243)
CO2 SERPL-SCNC: 33 MMOL/L (ref 20–32)
CREAT SERPL-MCNC: 0.85 MG/DL (ref 0.5–1.05)
CREAT UR-MCNC: 29 MG/DL (ref 20–275)
EGFRCR SERPLBLD CKD-EPI 2021: 75 ML/MIN/1.73M2
EOSINOPHIL # BLD AUTO: 235 CELLS/UL (ref 15–500)
EOSINOPHIL NFR BLD AUTO: 2.5 %
ERYTHROCYTE [DISTWIDTH] IN BLOOD BY AUTOMATED COUNT: 14.4 % (ref 11–15)
EST. AVERAGE GLUCOSE BLD GHB EST-MCNC: 123 MG/DL
EST. AVERAGE GLUCOSE BLD GHB EST-SCNC: 6.8 MMOL/L
GLUCOSE SERPL-MCNC: 84 MG/DL (ref 65–139)
HBA1C MFR BLD: 5.9 % OF TOTAL HGB
HCT VFR BLD AUTO: 40 % (ref 35–45)
HDLC SERPL-MCNC: 51 MG/DL
HGB BLD-MCNC: 13.1 G/DL (ref 11.7–15.5)
LDLC SERPL CALC-MCNC: 61 MG/DL (CALC)
LYMPHOCYTES # BLD AUTO: 2923 CELLS/UL (ref 850–3900)
LYMPHOCYTES NFR BLD AUTO: 31.1 %
MCH RBC QN AUTO: 29.2 PG (ref 27–33)
MCHC RBC AUTO-ENTMCNC: 32.8 G/DL (ref 32–36)
MCV RBC AUTO: 89.3 FL (ref 80–100)
MICROALBUMIN UR-MCNC: <0.2 MG/DL
MONOCYTES # BLD AUTO: 498 CELLS/UL (ref 200–950)
MONOCYTES NFR BLD AUTO: 5.3 %
NEUTROPHILS # BLD AUTO: 5687 CELLS/UL (ref 1500–7800)
NEUTROPHILS NFR BLD AUTO: 60.5 %
NONHDLC SERPL-MCNC: 75 MG/DL (CALC)
PLATELET # BLD AUTO: 232 THOUSAND/UL (ref 140–400)
PMV BLD REES-ECKER: 11.8 FL (ref 7.5–12.5)
POTASSIUM SERPL-SCNC: 3.8 MMOL/L (ref 3.5–5.3)
PROT SERPL-MCNC: 7.1 G/DL (ref 6.1–8.1)
RBC # BLD AUTO: 4.48 MILLION/UL (ref 3.8–5.1)
SODIUM SERPL-SCNC: 142 MMOL/L (ref 135–146)
TRIGL SERPL-MCNC: 56 MG/DL
TSH SERPL-ACNC: 1 MIU/L (ref 0.4–4.5)
WBC # BLD AUTO: 9.4 THOUSAND/UL (ref 3.8–10.8)

## 2025-03-20 LAB
CYTOLOGY CMNT CVX/VAG CYTO-IMP: NORMAL
HPV HR 12 DNA GENITAL QL NAA+PROBE: NEGATIVE
HPV HR GENOTYPES PNL CVX NAA+PROBE: NEGATIVE
HPV16 DNA SPEC QL NAA+PROBE: NEGATIVE
HPV18 DNA SPEC QL NAA+PROBE: NEGATIVE
LAB AP HPV GENOTYPE QUESTION: YES
LAB AP HPV HR: NORMAL
LABORATORY COMMENT REPORT: NORMAL
PATH REPORT.TOTAL CANCER: NORMAL

## 2025-03-23 DIAGNOSIS — H92.02 LEFT EAR PAIN: ICD-10-CM

## 2025-03-24 RX ORDER — CETIRIZINE HYDROCHLORIDE 10 MG/1
10 TABLET, FILM COATED ORAL DAILY PRN
Qty: 30 TABLET | Refills: 11 | Status: SHIPPED | OUTPATIENT
Start: 2025-03-24

## 2025-04-07 ENCOUNTER — HOSPITAL ENCOUNTER (OUTPATIENT)
Dept: RADIOLOGY | Facility: HOSPITAL | Age: 70
Discharge: HOME | End: 2025-04-07
Payer: MEDICARE

## 2025-04-07 DIAGNOSIS — Z12.31 ENCOUNTER FOR SCREENING MAMMOGRAM FOR MALIGNANT NEOPLASM OF BREAST: ICD-10-CM

## 2025-04-07 PROCEDURE — 77063 BREAST TOMOSYNTHESIS BI: CPT

## 2025-04-07 PROCEDURE — 77067 SCR MAMMO BI INCL CAD: CPT | Performed by: RADIOLOGY

## 2025-04-07 PROCEDURE — 77063 BREAST TOMOSYNTHESIS BI: CPT | Performed by: RADIOLOGY

## 2025-04-16 ENCOUNTER — PROCEDURE VISIT (OUTPATIENT)
Dept: GASTROENTEROLOGY | Facility: CLINIC | Age: 70
End: 2025-04-16
Payer: MEDICARE

## 2025-04-16 DIAGNOSIS — R14.3 FLATULENCE: ICD-10-CM

## 2025-04-16 PROCEDURE — 91065 BREATH HYDROGEN/METHANE TEST: CPT | Performed by: NURSE PRACTITIONER

## 2025-04-23 ENCOUNTER — PROCEDURE VISIT (OUTPATIENT)
Dept: GASTROENTEROLOGY | Facility: CLINIC | Age: 70
End: 2025-04-23
Payer: MEDICARE

## 2025-04-23 DIAGNOSIS — I10 HYPERTENSION, UNSPECIFIED TYPE: ICD-10-CM

## 2025-04-23 DIAGNOSIS — R14.3 FLATULENCE: Primary | ICD-10-CM

## 2025-04-23 PROCEDURE — 91065 BREATH HYDROGEN/METHANE TEST: CPT

## 2025-04-23 RX ORDER — AMLODIPINE BESYLATE 5 MG/1
5 TABLET ORAL DAILY
Qty: 90 TABLET | Refills: 3 | Status: SHIPPED | OUTPATIENT
Start: 2025-04-23

## 2025-04-23 NOTE — PROGRESS NOTES
Patient ID: Britt Guillaume is a 69 y.o. female.    Breath Hydrogen Test-Dextrose    Date/Time: 4/23/2025 11:19 AM    Performed by: Gemma Martinez MA  Authorized by: BLU Feng    Consent:     Consent obtained:  Verbal    Consent given by:  Patient  Universal protocol:     Procedure explained and questions answered to patient or proxy's satisfaction: yes      Relevant documents present and verified: yes      Test results available: yes      Patient identity confirmed:  Verbally with patient  Sedation:     Sedation type:  None  Anesthesia:     Anesthesia method:  None  Post-procedure details:     Procedure completion:  Tolerated  Hydrogen Breath Analysis Consultation Sheet    Referring Provider: BLU Feng  [address] [facility], [address]  [place] [phone]    Indication: R/O SIBO    Symptoms: Flatulence (R14.3)    Age: 69 y.o.  Weight: There were no vitals filed for this visit.  Substrate: Glucose   Dose: 75 grams    Last Meal: 4/22/25 1800  Recent Antibiotics: Denies    RESULTS:   Time PPM (H2) APPM* (CH4) CO2 Correction   Baseline #1 0905 19 6 3.8 1.44   Baseline #2 0910 27 7 3.3 1.66   *Challenge Dose Sugar: 0915  15' 0930 49 9 2.9 1.89   30' 0945 27 7 3.2 1.71   45' 1000 34 9 3.2 1.71   60' 1015 31 7 3.0 1.83   75' 1030 19 5 3.1 1.77   90' 1045 27 7 3.0 1.83   105' 1100 24 6 2.8 1.96   120' 1115 22 7 3.0 1.83   135'        150'        165'        180'          Impression: Positive for SIBO

## 2025-04-24 ENCOUNTER — SPECIALTY PHARMACY (OUTPATIENT)
Dept: PHARMACY | Facility: CLINIC | Age: 70
End: 2025-04-24

## 2025-04-24 DIAGNOSIS — K63.8219 SMALL INTESTINAL BACTERIAL OVERGROWTH (SIBO): Primary | ICD-10-CM

## 2025-04-25 DIAGNOSIS — R92.8 ABNORMAL SCREENING MAMMOGRAM: Primary | ICD-10-CM

## 2025-04-30 ENCOUNTER — HOSPITAL ENCOUNTER (OUTPATIENT)
Dept: RADIOLOGY | Facility: HOSPITAL | Age: 70
Discharge: HOME | End: 2025-04-30
Payer: MEDICARE

## 2025-04-30 DIAGNOSIS — R92.8 ABNORMAL SCREENING MAMMOGRAM: ICD-10-CM

## 2025-04-30 PROCEDURE — 76982 USE 1ST TARGET LESION: CPT

## 2025-04-30 PROCEDURE — 76642 ULTRASOUND BREAST LIMITED: CPT | Mod: RT

## 2025-04-30 PROCEDURE — 77061 BREAST TOMOSYNTHESIS UNI: CPT | Mod: RT

## 2025-05-01 ENCOUNTER — SPECIALTY PHARMACY (OUTPATIENT)
Dept: PHARMACY | Facility: CLINIC | Age: 70
End: 2025-05-01

## 2025-05-02 ENCOUNTER — SPECIALTY PHARMACY (OUTPATIENT)
Dept: PHARMACY | Facility: CLINIC | Age: 70
End: 2025-05-02

## 2025-05-14 ENCOUNTER — SPECIALTY PHARMACY (OUTPATIENT)
Dept: PHARMACY | Facility: CLINIC | Age: 70
End: 2025-05-14

## 2025-05-14 PROCEDURE — RXMED WILLOW AMBULATORY MEDICATION CHARGE

## 2025-05-15 ENCOUNTER — PHARMACY VISIT (OUTPATIENT)
Dept: PHARMACY | Facility: CLINIC | Age: 70
End: 2025-05-15
Payer: COMMERCIAL

## 2025-06-10 ENCOUNTER — SPECIALTY PHARMACY (OUTPATIENT)
Dept: PHARMACY | Facility: CLINIC | Age: 70
End: 2025-06-10

## 2025-06-17 ENCOUNTER — SPECIALTY PHARMACY (OUTPATIENT)
Dept: PHARMACY | Facility: CLINIC | Age: 70
End: 2025-06-17

## 2025-06-24 ENCOUNTER — SPECIALTY PHARMACY (OUTPATIENT)
Dept: PHARMACY | Facility: CLINIC | Age: 70
End: 2025-06-24

## 2025-06-25 DIAGNOSIS — K58.1 IRRITABLE BOWEL SYNDROME WITH CONSTIPATION: Primary | ICD-10-CM

## 2025-06-30 ENCOUNTER — SPECIALTY PHARMACY (OUTPATIENT)
Dept: PHARMACY | Facility: CLINIC | Age: 70
End: 2025-06-30

## 2025-09-30 ENCOUNTER — APPOINTMENT (OUTPATIENT)
Dept: PRIMARY CARE | Facility: CLINIC | Age: 70
End: 2025-09-30
Payer: MEDICARE

## 2026-03-16 ENCOUNTER — APPOINTMENT (OUTPATIENT)
Dept: PRIMARY CARE | Facility: CLINIC | Age: 71
End: 2026-03-16
Payer: MEDICARE